# Patient Record
Sex: MALE | HISPANIC OR LATINO | Employment: OTHER | URBAN - METROPOLITAN AREA
[De-identification: names, ages, dates, MRNs, and addresses within clinical notes are randomized per-mention and may not be internally consistent; named-entity substitution may affect disease eponyms.]

---

## 2018-09-07 ENCOUNTER — HOSPITAL ENCOUNTER (OUTPATIENT)
Dept: PULMONOLOGY | Facility: MEDICAL CENTER | Age: 73
End: 2018-09-07
Attending: INTERNAL MEDICINE
Payer: COMMERCIAL

## 2018-09-07 PROCEDURE — 99201 HCHG OFFICE VISIT-NEW-LEVEL 1: CPT | Mod: 25

## 2018-09-07 PROCEDURE — 94618 PULMONARY STRESS TESTING: CPT

## 2018-09-11 ENCOUNTER — HOSPITAL ENCOUNTER (OUTPATIENT)
Dept: PULMONOLOGY | Facility: MEDICAL CENTER | Age: 73
End: 2018-09-11
Attending: INTERNAL MEDICINE
Payer: COMMERCIAL

## 2018-09-11 PROCEDURE — G0237 THERAPEUTIC PROCD STRG ENDUR: HCPCS

## 2018-09-11 PROCEDURE — G0239 OTH RESP PROC, GROUP: HCPCS

## 2018-09-13 ENCOUNTER — HOSPITAL ENCOUNTER (OUTPATIENT)
Dept: PULMONOLOGY | Facility: MEDICAL CENTER | Age: 73
End: 2018-09-13
Attending: INTERNAL MEDICINE
Payer: COMMERCIAL

## 2018-09-13 PROCEDURE — G0237 THERAPEUTIC PROCD STRG ENDUR: HCPCS

## 2018-09-13 PROCEDURE — G0239 OTH RESP PROC, GROUP: HCPCS

## 2018-09-18 ENCOUNTER — HOSPITAL ENCOUNTER (OUTPATIENT)
Dept: PULMONOLOGY | Facility: MEDICAL CENTER | Age: 73
End: 2018-09-18
Attending: INTERNAL MEDICINE
Payer: COMMERCIAL

## 2018-09-18 PROCEDURE — G0237 THERAPEUTIC PROCD STRG ENDUR: HCPCS

## 2018-09-18 PROCEDURE — G0239 OTH RESP PROC, GROUP: HCPCS

## 2018-09-20 ENCOUNTER — APPOINTMENT (OUTPATIENT)
Dept: PULMONOLOGY | Facility: MEDICAL CENTER | Age: 73
End: 2018-09-20
Attending: INTERNAL MEDICINE
Payer: COMMERCIAL

## 2018-09-25 ENCOUNTER — HOSPITAL ENCOUNTER (OUTPATIENT)
Dept: PULMONOLOGY | Facility: MEDICAL CENTER | Age: 73
End: 2018-09-25
Attending: INTERNAL MEDICINE
Payer: COMMERCIAL

## 2018-09-25 PROCEDURE — G0237 THERAPEUTIC PROCD STRG ENDUR: HCPCS

## 2018-09-25 PROCEDURE — G0239 OTH RESP PROC, GROUP: HCPCS

## 2018-09-27 ENCOUNTER — HOSPITAL ENCOUNTER (OUTPATIENT)
Dept: PULMONOLOGY | Facility: MEDICAL CENTER | Age: 73
End: 2018-09-27
Attending: INTERNAL MEDICINE
Payer: COMMERCIAL

## 2018-09-27 PROCEDURE — G0237 THERAPEUTIC PROCD STRG ENDUR: HCPCS

## 2018-09-27 PROCEDURE — G0239 OTH RESP PROC, GROUP: HCPCS

## 2018-10-02 ENCOUNTER — APPOINTMENT (OUTPATIENT)
Dept: PULMONOLOGY | Facility: MEDICAL CENTER | Age: 73
End: 2018-10-02
Attending: INTERNAL MEDICINE
Payer: COMMERCIAL

## 2018-10-04 ENCOUNTER — APPOINTMENT (OUTPATIENT)
Dept: PULMONOLOGY | Facility: MEDICAL CENTER | Age: 73
End: 2018-10-04
Attending: INTERNAL MEDICINE
Payer: COMMERCIAL

## 2018-10-09 ENCOUNTER — APPOINTMENT (OUTPATIENT)
Dept: PULMONOLOGY | Facility: MEDICAL CENTER | Age: 73
End: 2018-10-09
Attending: INTERNAL MEDICINE
Payer: COMMERCIAL

## 2018-10-11 ENCOUNTER — APPOINTMENT (OUTPATIENT)
Dept: PULMONOLOGY | Facility: MEDICAL CENTER | Age: 73
End: 2018-10-11
Attending: INTERNAL MEDICINE
Payer: COMMERCIAL

## 2018-10-16 ENCOUNTER — HOSPITAL ENCOUNTER (OUTPATIENT)
Dept: PULMONOLOGY | Facility: MEDICAL CENTER | Age: 73
End: 2018-10-16
Attending: INTERNAL MEDICINE
Payer: COMMERCIAL

## 2018-10-16 PROCEDURE — G0237 THERAPEUTIC PROCD STRG ENDUR: HCPCS | Mod: XU

## 2018-10-16 PROCEDURE — G0239 OTH RESP PROC, GROUP: HCPCS

## 2018-10-18 ENCOUNTER — HOSPITAL ENCOUNTER (OUTPATIENT)
Dept: PULMONOLOGY | Facility: MEDICAL CENTER | Age: 73
End: 2018-10-18
Attending: INTERNAL MEDICINE
Payer: COMMERCIAL

## 2018-10-18 PROCEDURE — G0239 OTH RESP PROC, GROUP: HCPCS

## 2018-10-18 PROCEDURE — G0237 THERAPEUTIC PROCD STRG ENDUR: HCPCS | Mod: XU

## 2018-10-23 ENCOUNTER — APPOINTMENT (OUTPATIENT)
Dept: PULMONOLOGY | Facility: MEDICAL CENTER | Age: 73
End: 2018-10-23
Attending: INTERNAL MEDICINE
Payer: COMMERCIAL

## 2018-10-30 ENCOUNTER — APPOINTMENT (OUTPATIENT)
Dept: PULMONOLOGY | Facility: MEDICAL CENTER | Age: 73
End: 2018-10-30
Attending: INTERNAL MEDICINE
Payer: COMMERCIAL

## 2018-11-06 ENCOUNTER — HOSPITAL ENCOUNTER (OUTPATIENT)
Dept: PULMONOLOGY | Facility: MEDICAL CENTER | Age: 73
End: 2018-11-06
Attending: INTERNAL MEDICINE
Payer: COMMERCIAL

## 2018-11-06 PROCEDURE — G0237 THERAPEUTIC PROCD STRG ENDUR: HCPCS | Mod: XU

## 2018-11-06 PROCEDURE — G0239 OTH RESP PROC, GROUP: HCPCS

## 2018-11-08 ENCOUNTER — HOSPITAL ENCOUNTER (OUTPATIENT)
Dept: PULMONOLOGY | Facility: MEDICAL CENTER | Age: 73
End: 2018-11-08
Attending: INTERNAL MEDICINE
Payer: COMMERCIAL

## 2018-11-08 PROCEDURE — G0237 THERAPEUTIC PROCD STRG ENDUR: HCPCS | Mod: XU

## 2018-11-08 PROCEDURE — G0239 OTH RESP PROC, GROUP: HCPCS

## 2018-11-13 ENCOUNTER — HOSPITAL ENCOUNTER (OUTPATIENT)
Dept: PULMONOLOGY | Facility: MEDICAL CENTER | Age: 73
End: 2018-11-13
Attending: INTERNAL MEDICINE
Payer: COMMERCIAL

## 2018-11-13 PROCEDURE — G0237 THERAPEUTIC PROCD STRG ENDUR: HCPCS | Mod: XU

## 2018-11-13 PROCEDURE — G0239 OTH RESP PROC, GROUP: HCPCS

## 2018-11-15 ENCOUNTER — APPOINTMENT (OUTPATIENT)
Dept: PULMONOLOGY | Facility: MEDICAL CENTER | Age: 73
End: 2018-11-15
Attending: INTERNAL MEDICINE
Payer: COMMERCIAL

## 2018-11-20 ENCOUNTER — HOSPITAL ENCOUNTER (OUTPATIENT)
Dept: PULMONOLOGY | Facility: MEDICAL CENTER | Age: 73
End: 2018-11-20
Attending: INTERNAL MEDICINE
Payer: COMMERCIAL

## 2018-11-20 PROCEDURE — G0237 THERAPEUTIC PROCD STRG ENDUR: HCPCS | Mod: XU

## 2018-11-20 PROCEDURE — G0239 OTH RESP PROC, GROUP: HCPCS

## 2018-11-27 ENCOUNTER — HOSPITAL ENCOUNTER (OUTPATIENT)
Dept: PULMONOLOGY | Facility: MEDICAL CENTER | Age: 73
End: 2018-11-27
Attending: INTERNAL MEDICINE
Payer: COMMERCIAL

## 2018-11-27 PROCEDURE — G0237 THERAPEUTIC PROCD STRG ENDUR: HCPCS | Mod: XU

## 2018-11-27 PROCEDURE — G0239 OTH RESP PROC, GROUP: HCPCS

## 2018-11-29 ENCOUNTER — HOSPITAL ENCOUNTER (OUTPATIENT)
Dept: PULMONOLOGY | Facility: MEDICAL CENTER | Age: 73
End: 2018-11-29
Attending: INTERNAL MEDICINE
Payer: COMMERCIAL

## 2018-11-29 PROCEDURE — G0239 OTH RESP PROC, GROUP: HCPCS

## 2018-11-29 PROCEDURE — G0237 THERAPEUTIC PROCD STRG ENDUR: HCPCS | Mod: XU

## 2018-12-04 ENCOUNTER — HOSPITAL ENCOUNTER (OUTPATIENT)
Dept: PULMONOLOGY | Facility: MEDICAL CENTER | Age: 73
End: 2018-12-04
Attending: INTERNAL MEDICINE
Payer: MEDICARE

## 2018-12-04 PROCEDURE — G0239 OTH RESP PROC, GROUP: HCPCS

## 2018-12-04 PROCEDURE — G0237 THERAPEUTIC PROCD STRG ENDUR: HCPCS | Mod: XU

## 2018-12-06 ENCOUNTER — HOSPITAL ENCOUNTER (OUTPATIENT)
Dept: PULMONOLOGY | Facility: MEDICAL CENTER | Age: 73
End: 2018-12-06
Attending: INTERNAL MEDICINE
Payer: MEDICARE

## 2018-12-06 PROCEDURE — G0239 OTH RESP PROC, GROUP: HCPCS

## 2018-12-06 PROCEDURE — G0237 THERAPEUTIC PROCD STRG ENDUR: HCPCS | Mod: XU

## 2018-12-11 ENCOUNTER — HOSPITAL ENCOUNTER (OUTPATIENT)
Dept: PULMONOLOGY | Facility: MEDICAL CENTER | Age: 73
End: 2018-12-11
Attending: INTERNAL MEDICINE
Payer: MEDICARE

## 2018-12-11 PROCEDURE — G0237 THERAPEUTIC PROCD STRG ENDUR: HCPCS | Mod: XU

## 2018-12-11 PROCEDURE — G0239 OTH RESP PROC, GROUP: HCPCS

## 2018-12-13 ENCOUNTER — HOSPITAL ENCOUNTER (OUTPATIENT)
Dept: PULMONOLOGY | Facility: MEDICAL CENTER | Age: 73
End: 2018-12-13
Attending: INTERNAL MEDICINE
Payer: MEDICARE

## 2018-12-13 PROCEDURE — G0237 THERAPEUTIC PROCD STRG ENDUR: HCPCS | Mod: XU

## 2018-12-13 PROCEDURE — G0239 OTH RESP PROC, GROUP: HCPCS

## 2018-12-18 ENCOUNTER — HOSPITAL ENCOUNTER (OUTPATIENT)
Dept: PULMONOLOGY | Facility: MEDICAL CENTER | Age: 73
End: 2018-12-18
Attending: INTERNAL MEDICINE
Payer: MEDICARE

## 2018-12-20 ENCOUNTER — HOSPITAL ENCOUNTER (OUTPATIENT)
Dept: PULMONOLOGY | Facility: MEDICAL CENTER | Age: 73
End: 2018-12-20
Attending: INTERNAL MEDICINE
Payer: MEDICARE

## 2018-12-20 PROCEDURE — G0239 OTH RESP PROC, GROUP: HCPCS

## 2018-12-20 PROCEDURE — G0237 THERAPEUTIC PROCD STRG ENDUR: HCPCS

## 2021-01-15 DIAGNOSIS — Z23 NEED FOR VACCINATION: ICD-10-CM

## 2023-03-28 ENCOUNTER — HOSPITAL ENCOUNTER (INPATIENT)
Facility: MEDICAL CENTER | Age: 78
LOS: 3 days | DRG: 871 | End: 2023-03-31
Attending: EMERGENCY MEDICINE | Admitting: INTERNAL MEDICINE
Payer: COMMERCIAL

## 2023-03-28 ENCOUNTER — APPOINTMENT (OUTPATIENT)
Dept: RADIOLOGY | Facility: MEDICAL CENTER | Age: 78
DRG: 871 | End: 2023-03-28
Attending: EMERGENCY MEDICINE
Payer: COMMERCIAL

## 2023-03-28 DIAGNOSIS — Z51.5 PATIENT ON FULL HOSPICE CARE: ICD-10-CM

## 2023-03-28 DIAGNOSIS — J84.9 INTERSTITIAL LUNG DISEASE (HCC): ICD-10-CM

## 2023-03-28 DIAGNOSIS — C61 PROSTATE CANCER (HCC): ICD-10-CM

## 2023-03-28 DIAGNOSIS — J18.9 COMMUNITY ACQUIRED PNEUMONIA, UNSPECIFIED LATERALITY: ICD-10-CM

## 2023-03-28 PROBLEM — Z79.4 TYPE 2 DIABETES MELLITUS WITH HYPERGLYCEMIA, WITH LONG-TERM CURRENT USE OF INSULIN (HCC): Status: ACTIVE | Noted: 2023-03-28

## 2023-03-28 PROBLEM — A41.9 SEPSIS (HCC): Status: ACTIVE | Noted: 2023-03-28

## 2023-03-28 PROBLEM — E11.65 TYPE 2 DIABETES MELLITUS WITH HYPERGLYCEMIA, WITH LONG-TERM CURRENT USE OF INSULIN (HCC): Status: ACTIVE | Noted: 2023-03-28

## 2023-03-28 PROBLEM — E87.5 HYPERKALEMIA: Status: ACTIVE | Noted: 2023-03-28

## 2023-03-28 PROBLEM — N40.0 BPH (BENIGN PROSTATIC HYPERPLASIA): Status: ACTIVE | Noted: 2023-03-28

## 2023-03-28 PROBLEM — J96.21 ACUTE ON CHRONIC RESPIRATORY FAILURE WITH HYPOXIA (HCC): Status: ACTIVE | Noted: 2023-03-28

## 2023-03-28 LAB
ALBUMIN SERPL BCP-MCNC: 4.1 G/DL (ref 3.2–4.9)
ALBUMIN/GLOB SERPL: 1.3 G/DL
ALP SERPL-CCNC: 59 U/L (ref 30–99)
ALT SERPL-CCNC: 38 U/L (ref 2–50)
ANION GAP SERPL CALC-SCNC: 11 MMOL/L (ref 7–16)
ANION GAP SERPL CALC-SCNC: 13 MMOL/L (ref 7–16)
AST SERPL-CCNC: 38 U/L (ref 12–45)
BASOPHILS # BLD AUTO: 0.4 % (ref 0–1.8)
BASOPHILS # BLD: 0.06 K/UL (ref 0–0.12)
BILIRUB SERPL-MCNC: 0.4 MG/DL (ref 0.1–1.5)
BUN SERPL-MCNC: 20 MG/DL (ref 8–22)
BUN SERPL-MCNC: 21 MG/DL (ref 8–22)
CALCIUM ALBUM COR SERPL-MCNC: 8.9 MG/DL (ref 8.5–10.5)
CALCIUM SERPL-MCNC: 7.2 MG/DL (ref 8.5–10.5)
CALCIUM SERPL-MCNC: 9 MG/DL (ref 8.5–10.5)
CHLORIDE SERPL-SCNC: 86 MMOL/L (ref 96–112)
CHLORIDE SERPL-SCNC: 97 MMOL/L (ref 96–112)
CO2 SERPL-SCNC: 21 MMOL/L (ref 20–33)
CO2 SERPL-SCNC: 29 MMOL/L (ref 20–33)
CREAT SERPL-MCNC: 0.72 MG/DL (ref 0.5–1.4)
CREAT SERPL-MCNC: 0.9 MG/DL (ref 0.5–1.4)
EKG IMPRESSION: NORMAL
EKG IMPRESSION: NORMAL
EOSINOPHIL # BLD AUTO: 0 K/UL (ref 0–0.51)
EOSINOPHIL NFR BLD: 0 % (ref 0–6.9)
ERYTHROCYTE [DISTWIDTH] IN BLOOD BY AUTOMATED COUNT: 45.5 FL (ref 35.9–50)
FLUAV RNA SPEC QL NAA+PROBE: NEGATIVE
FLUBV RNA SPEC QL NAA+PROBE: NEGATIVE
GFR SERPLBLD CREATININE-BSD FMLA CKD-EPI: 89 ML/MIN/1.73 M 2
GFR SERPLBLD CREATININE-BSD FMLA CKD-EPI: 95 ML/MIN/1.73 M 2
GLOBULIN SER CALC-MCNC: 3.2 G/DL (ref 1.9–3.5)
GLUCOSE BLD STRIP.AUTO-MCNC: 125 MG/DL (ref 65–99)
GLUCOSE BLD STRIP.AUTO-MCNC: 129 MG/DL (ref 65–99)
GLUCOSE BLD STRIP.AUTO-MCNC: 88 MG/DL (ref 65–99)
GLUCOSE SERPL-MCNC: 164 MG/DL (ref 65–99)
GLUCOSE SERPL-MCNC: 713 MG/DL (ref 65–99)
HCT VFR BLD AUTO: 39.7 % (ref 42–52)
HGB BLD-MCNC: 12.7 G/DL (ref 14–18)
IMM GRANULOCYTES # BLD AUTO: 0.08 K/UL (ref 0–0.11)
IMM GRANULOCYTES NFR BLD AUTO: 0.6 % (ref 0–0.9)
LACTATE SERPL-SCNC: 2.2 MMOL/L (ref 0.5–2)
LACTATE SERPL-SCNC: 3.1 MMOL/L (ref 0.5–2)
LACTATE SERPL-SCNC: 3.6 MMOL/L (ref 0.5–2)
LYMPHOCYTES # BLD AUTO: 1.34 K/UL (ref 1–4.8)
LYMPHOCYTES NFR BLD: 9.5 % (ref 22–41)
MCH RBC QN AUTO: 27.9 PG (ref 27–33)
MCHC RBC AUTO-ENTMCNC: 32 G/DL (ref 33.7–35.3)
MCV RBC AUTO: 87.1 FL (ref 81.4–97.8)
MONOCYTES # BLD AUTO: 1.18 K/UL (ref 0–0.85)
MONOCYTES NFR BLD AUTO: 8.3 % (ref 0–13.4)
NEUTROPHILS # BLD AUTO: 11.51 K/UL (ref 1.82–7.42)
NEUTROPHILS NFR BLD: 81.2 % (ref 44–72)
NRBC # BLD AUTO: 0 K/UL
NRBC BLD-RTO: 0 /100 WBC
NT-PROBNP SERPL IA-MCNC: 2076 PG/ML (ref 0–125)
PLATELET # BLD AUTO: 301 K/UL (ref 164–446)
PMV BLD AUTO: 10.9 FL (ref 9–12.9)
POTASSIUM SERPL-SCNC: 4.6 MMOL/L (ref 3.6–5.5)
POTASSIUM SERPL-SCNC: 6.3 MMOL/L (ref 3.6–5.5)
PROCALCITONIN SERPL-MCNC: 0.62 NG/ML
PROT SERPL-MCNC: 7.3 G/DL (ref 6–8.2)
RBC # BLD AUTO: 4.56 M/UL (ref 4.7–6.1)
RSV RNA SPEC QL NAA+PROBE: NEGATIVE
SARS-COV-2 RNA RESP QL NAA+PROBE: NOTDETECTED
SODIUM SERPL-SCNC: 120 MMOL/L (ref 135–145)
SODIUM SERPL-SCNC: 137 MMOL/L (ref 135–145)
SPECIMEN SOURCE: NORMAL
WBC # BLD AUTO: 14.2 K/UL (ref 4.8–10.8)

## 2023-03-28 PROCEDURE — 83605 ASSAY OF LACTIC ACID: CPT

## 2023-03-28 PROCEDURE — 700111 HCHG RX REV CODE 636 W/ 250 OVERRIDE (IP): Performed by: EMERGENCY MEDICINE

## 2023-03-28 PROCEDURE — 93010 ELECTROCARDIOGRAM REPORT: CPT | Mod: GW | Performed by: INTERNAL MEDICINE

## 2023-03-28 PROCEDURE — 80053 COMPREHEN METABOLIC PANEL: CPT

## 2023-03-28 PROCEDURE — 93005 ELECTROCARDIOGRAM TRACING: CPT | Performed by: EMERGENCY MEDICINE

## 2023-03-28 PROCEDURE — C9803 HOPD COVID-19 SPEC COLLECT: HCPCS | Performed by: EMERGENCY MEDICINE

## 2023-03-28 PROCEDURE — 36415 COLL VENOUS BLD VENIPUNCTURE: CPT

## 2023-03-28 PROCEDURE — 87040 BLOOD CULTURE FOR BACTERIA: CPT | Mod: 91

## 2023-03-28 PROCEDURE — 700101 HCHG RX REV CODE 250: Performed by: EMERGENCY MEDICINE

## 2023-03-28 PROCEDURE — 99223 1ST HOSP IP/OBS HIGH 75: CPT | Mod: AI | Performed by: INTERNAL MEDICINE

## 2023-03-28 PROCEDURE — 96365 THER/PROPH/DIAG IV INF INIT: CPT

## 2023-03-28 PROCEDURE — 700105 HCHG RX REV CODE 258: Performed by: EMERGENCY MEDICINE

## 2023-03-28 PROCEDURE — 93005 ELECTROCARDIOGRAM TRACING: CPT

## 2023-03-28 PROCEDURE — 0241U HCHG SARS-COV-2 COVID-19 NFCT DS RESP RNA 4 TRGT MIC: CPT

## 2023-03-28 PROCEDURE — 71045 X-RAY EXAM CHEST 1 VIEW: CPT

## 2023-03-28 PROCEDURE — 82962 GLUCOSE BLOOD TEST: CPT | Mod: 91

## 2023-03-28 PROCEDURE — 83880 ASSAY OF NATRIURETIC PEPTIDE: CPT

## 2023-03-28 PROCEDURE — A9270 NON-COVERED ITEM OR SERVICE: HCPCS | Performed by: INTERNAL MEDICINE

## 2023-03-28 PROCEDURE — 700102 HCHG RX REV CODE 250 W/ 637 OVERRIDE(OP): Performed by: INTERNAL MEDICINE

## 2023-03-28 PROCEDURE — 84145 PROCALCITONIN (PCT): CPT

## 2023-03-28 PROCEDURE — 770020 HCHG ROOM/CARE - TELE (206)

## 2023-03-28 PROCEDURE — 85025 COMPLETE CBC W/AUTO DIFF WBC: CPT

## 2023-03-28 PROCEDURE — 80048 BASIC METABOLIC PNL TOTAL CA: CPT

## 2023-03-28 PROCEDURE — 700105 HCHG RX REV CODE 258: Performed by: INTERNAL MEDICINE

## 2023-03-28 PROCEDURE — 700111 HCHG RX REV CODE 636 W/ 250 OVERRIDE (IP): Performed by: INTERNAL MEDICINE

## 2023-03-28 PROCEDURE — 99285 EMERGENCY DEPT VISIT HI MDM: CPT

## 2023-03-28 PROCEDURE — 96375 TX/PRO/DX INJ NEW DRUG ADDON: CPT

## 2023-03-28 RX ORDER — AZITHROMYCIN 500 MG/5ML
500 INJECTION, POWDER, LYOPHILIZED, FOR SOLUTION INTRAVENOUS ONCE
Status: COMPLETED | OUTPATIENT
Start: 2023-03-28 | End: 2023-03-28

## 2023-03-28 RX ORDER — AMOXICILLIN 250 MG
2 CAPSULE ORAL 2 TIMES DAILY
Status: DISCONTINUED | OUTPATIENT
Start: 2023-03-28 | End: 2023-03-31 | Stop reason: HOSPADM

## 2023-03-28 RX ORDER — TAMSULOSIN HYDROCHLORIDE 0.4 MG/1
0.4 CAPSULE ORAL DAILY
COMMUNITY

## 2023-03-28 RX ORDER — CEFTRIAXONE 2 G/1
2000 INJECTION, POWDER, FOR SOLUTION INTRAMUSCULAR; INTRAVENOUS ONCE
Status: COMPLETED | OUTPATIENT
Start: 2023-03-28 | End: 2023-03-28

## 2023-03-28 RX ORDER — SODIUM CHLORIDE 9 MG/ML
1000 INJECTION, SOLUTION INTRAVENOUS ONCE
Status: COMPLETED | OUTPATIENT
Start: 2023-03-28 | End: 2023-03-28

## 2023-03-28 RX ORDER — ACETAMINOPHEN 325 MG/1
650 TABLET ORAL EVERY 6 HOURS PRN
Status: DISCONTINUED | OUTPATIENT
Start: 2023-03-28 | End: 2023-03-31 | Stop reason: HOSPADM

## 2023-03-28 RX ORDER — ONDANSETRON 2 MG/ML
4 INJECTION INTRAMUSCULAR; INTRAVENOUS EVERY 4 HOURS PRN
Status: DISCONTINUED | OUTPATIENT
Start: 2023-03-28 | End: 2023-03-31 | Stop reason: HOSPADM

## 2023-03-28 RX ORDER — ENOXAPARIN SODIUM 100 MG/ML
40 INJECTION SUBCUTANEOUS DAILY
Status: DISCONTINUED | OUTPATIENT
Start: 2023-03-28 | End: 2023-03-31 | Stop reason: HOSPADM

## 2023-03-28 RX ORDER — ONDANSETRON 4 MG/1
4 TABLET, ORALLY DISINTEGRATING ORAL EVERY 4 HOURS PRN
Status: DISCONTINUED | OUTPATIENT
Start: 2023-03-28 | End: 2023-03-31 | Stop reason: HOSPADM

## 2023-03-28 RX ORDER — DOCUSATE CALCIUM 240 MG
240 CAPSULE ORAL
COMMUNITY

## 2023-03-28 RX ORDER — POLYETHYLENE GLYCOL 3350 17 G/17G
1 POWDER, FOR SOLUTION ORAL
Status: DISCONTINUED | OUTPATIENT
Start: 2023-03-28 | End: 2023-03-31 | Stop reason: HOSPADM

## 2023-03-28 RX ORDER — INSULIN GLARGINE 100 [IU]/ML
20 INJECTION, SOLUTION SUBCUTANEOUS EVERY EVENING
COMMUNITY

## 2023-03-28 RX ORDER — BISACODYL 10 MG
10 SUPPOSITORY, RECTAL RECTAL
Status: DISCONTINUED | OUTPATIENT
Start: 2023-03-28 | End: 2023-03-31 | Stop reason: HOSPADM

## 2023-03-28 RX ORDER — SODIUM CHLORIDE 9 MG/ML
INJECTION, SOLUTION INTRAVENOUS CONTINUOUS
Status: DISCONTINUED | OUTPATIENT
Start: 2023-03-28 | End: 2023-03-31 | Stop reason: HOSPADM

## 2023-03-28 RX ORDER — TAMSULOSIN HYDROCHLORIDE 0.4 MG/1
0.4 CAPSULE ORAL DAILY
Status: DISCONTINUED | OUTPATIENT
Start: 2023-03-28 | End: 2023-03-31 | Stop reason: HOSPADM

## 2023-03-28 RX ORDER — AZITHROMYCIN 250 MG/1
500 TABLET, FILM COATED ORAL DAILY
Status: COMPLETED | OUTPATIENT
Start: 2023-03-29 | End: 2023-03-30

## 2023-03-28 RX ADMIN — SODIUM CHLORIDE: 9 INJECTION, SOLUTION INTRAVENOUS at 17:19

## 2023-03-28 RX ADMIN — AZITHROMYCIN MONOHYDRATE 500 MG: 500 INJECTION, POWDER, LYOPHILIZED, FOR SOLUTION INTRAVENOUS at 14:45

## 2023-03-28 RX ADMIN — SODIUM CHLORIDE 1000 ML: 9 INJECTION, SOLUTION INTRAVENOUS at 14:45

## 2023-03-28 RX ADMIN — SENNOSIDES AND DOCUSATE SODIUM 2 TABLET: 50; 8.6 TABLET ORAL at 17:18

## 2023-03-28 RX ADMIN — TAMSULOSIN HYDROCHLORIDE 0.4 MG: 0.4 CAPSULE ORAL at 17:18

## 2023-03-28 RX ADMIN — INSULIN GLARGINE-YFGN 20 UNITS: 100 INJECTION, SOLUTION SUBCUTANEOUS at 18:43

## 2023-03-28 RX ADMIN — CEFTRIAXONE SODIUM 2000 MG: 2 INJECTION, POWDER, FOR SOLUTION INTRAMUSCULAR; INTRAVENOUS at 14:45

## 2023-03-28 RX ADMIN — INSULIN HUMAN 14 UNITS: 100 INJECTION, SOLUTION PARENTERAL at 18:42

## 2023-03-28 RX ADMIN — ENOXAPARIN SODIUM 40 MG: 100 INJECTION SUBCUTANEOUS at 17:18

## 2023-03-28 RX ADMIN — INSULIN HUMAN 5 UNITS: 100 INJECTION, SOLUTION PARENTERAL at 18:42

## 2023-03-28 ASSESSMENT — LIFESTYLE VARIABLES
AVERAGE NUMBER OF DAYS PER WEEK YOU HAVE A DRINK CONTAINING ALCOHOL: 0
CONSUMPTION TOTAL: NEGATIVE
ON A TYPICAL DAY WHEN YOU DRINK ALCOHOL HOW MANY DRINKS DO YOU HAVE: 0
HAVE YOU EVER FELT YOU SHOULD CUT DOWN ON YOUR DRINKING: NO
HOW MANY TIMES IN THE PAST YEAR HAVE YOU HAD 5 OR MORE DRINKS IN A DAY: 0
TOTAL SCORE: 0
HAVE PEOPLE ANNOYED YOU BY CRITICIZING YOUR DRINKING: NO
EVER FELT BAD OR GUILTY ABOUT YOUR DRINKING: NO
ALCOHOL_USE: NO
TOTAL SCORE: 0
EVER HAD A DRINK FIRST THING IN THE MORNING TO STEADY YOUR NERVES TO GET RID OF A HANGOVER: NO
TOTAL SCORE: 0

## 2023-03-28 ASSESSMENT — ENCOUNTER SYMPTOMS
SPUTUM PRODUCTION: 1
COUGH: 1
DIAPHORESIS: 0
SEIZURES: 0
WHEEZING: 0
BACK PAIN: 0
CHILLS: 0
VOMITING: 0
FLANK PAIN: 0
BRUISES/BLEEDS EASILY: 0
SORE THROAT: 0
BLURRED VISION: 0
NECK PAIN: 0
MYALGIAS: 0
SHORTNESS OF BREATH: 1
HEADACHES: 0
DIARRHEA: 0
FEVER: 0
DIZZINESS: 0
ABDOMINAL PAIN: 0
NAUSEA: 0
BLOOD IN STOOL: 0
PALPITATIONS: 0
FOCAL WEAKNESS: 0

## 2023-03-28 ASSESSMENT — COGNITIVE AND FUNCTIONAL STATUS - GENERAL
SUGGESTED CMS G CODE MODIFIER DAILY ACTIVITY: CH
SUGGESTED CMS G CODE MODIFIER MOBILITY: CH
DAILY ACTIVITIY SCORE: 24
MOBILITY SCORE: 24

## 2023-03-28 ASSESSMENT — PATIENT HEALTH QUESTIONNAIRE - PHQ9
SUM OF ALL RESPONSES TO PHQ9 QUESTIONS 1 AND 2: 0
2. FEELING DOWN, DEPRESSED, IRRITABLE, OR HOPELESS: NOT AT ALL
1. LITTLE INTEREST OR PLEASURE IN DOING THINGS: NOT AT ALL

## 2023-03-28 NOTE — ASSESSMENT & PLAN NOTE
Blood glucose 713 at admission, likely lab error given other values and no further evaluated values despite minimal therapy. A1c 6.0 (3/29/23).  - SSI  - add glargine as clinically indicated  - hypoglycemic protocol

## 2023-03-28 NOTE — ASSESSMENT & PLAN NOTE
Likely secondary to pneumonia. On 2L of o2 at baseline, required 3L in the ED. Echo (3/29/23) EF 55%.  - RT protocol  - treat possible infection  - IVF for now  - wean off o2 as tolerated

## 2023-03-28 NOTE — ED PROVIDER NOTES
ED Provider Note    CHIEF COMPLAINT  Chief Complaint   Patient presents with    ALOC     Pt's caregiver called for a wellness check after not being able to get ahold of patient by telephone. LKW 0030 this morning. Pt was 75% on RA upon EMS arrival. Pt is supposed to wear oxygen at all times at home. Pt was not wearing his oxygen and was lethargic. Pt is on hospice for prostate cancer, pt states he is not receiving chemotherapy. Pt presents with POLST. Pt's only complaint is right arm pain. Pt denies falls.        EXTERNAL RECORDS REVIEWED  Reviewed outpatient clinic visits laboratory studies POLST paperwork  HPI/ROS  LIMITATION TO HISTORY   Patient is a poor historian  OUTSIDE HISTORIAN(S):  Family provided additional history    Blair Bah is a 78 y.o. male who presents for evaluation of confusion cough not feeling well with hypoxia.  The patient apparently has a history of prostate cancer.  The initial nursing note was confusing but I clarified with the patient and his family that he is limited goals of care but not currently on hospice.  He is post to be on oxygen at night and currently was off it last night because he would did not feel well.  He is noted to be hypoxic and he does admit to low-grade fever and cough.  He reports feeling short of breath.    PAST MEDICAL HISTORY     Prostate cancer  SURGICAL HISTORY  patient denies any surgical history  Unknown  FAMILY HISTORY  History reviewed. No pertinent family history.  Noncontributory  SOCIAL HISTORY  Social History     Tobacco Use    Smoking status: Former     Types: Cigarettes    Smokeless tobacco: Never   Vaping Use    Vaping Use: Never used   Substance and Sexual Activity    Alcohol use: Not Currently    Drug use: Not Currently    Sexual activity: Not on file       CURRENT MEDICATIONS  Home Medications       Reviewed by Rebekah Bell R.N. (Registered Nurse) on 03/28/23 at 1225  Med List Status: Not Addressed     Medication Last Dose Status     "    Patient Manny Taking any Medications                           ALLERGIES  No Known Allergies    PHYSICAL EXAM  VITAL SIGNS: /55   Pulse (!) 104   Temp 36.3 °C (97.3 °F) (Temporal)   Resp 18   Ht 1.803 m (5' 11\")   Wt 79.4 kg (175 lb)   SpO2 97%   BMI 24.41 kg/m²    Pulse ox interpretation: I interpret this pulse ox as normal.  Constitutional: Alert and oriented x 3, mild distress, patient appears chronically ill  HEENT: Atraumatic normocephalic, pupils are equal round reactive to light extraocular movements are intact. The nares is clear, external ears are normal, mouth shows moist mucous membranes normal dentition for age  Neck: Supple, no JVD no tracheal deviation  Cardiovascular: Mild tachycardia no murmur rub or gallop 2+ pulses peripherally x4  Thorax & Lungs: Bilateral rhonchi no wheezing  GI: Soft nontender nondistended positive bowel sounds, no peritoneal signs  Skin: Warm dry no acute rash or lesion  Musculoskeletal: Moving all extremities with full range and 5 of 5 strength no acute  deformity  Neurologic: Cranial nerves III through XII are grossly intact no sensory deficit no cerebellar dysfunction   Psychiatric: Appropriate affect for situation at this time          DIAGNOSTIC STUDIES / PROCEDURES    LABS  Results for orders placed or performed during the hospital encounter of 03/28/23   CBC with Differential   Result Value Ref Range    WBC 14.2 (H) 4.8 - 10.8 K/uL    RBC 4.56 (L) 4.70 - 6.10 M/uL    Hemoglobin 12.7 (L) 14.0 - 18.0 g/dL    Hematocrit 39.7 (L) 42.0 - 52.0 %    MCV 87.1 81.4 - 97.8 fL    MCH 27.9 27.0 - 33.0 pg    MCHC 32.0 (L) 33.7 - 35.3 g/dL    RDW 45.5 35.9 - 50.0 fL    Platelet Count 301 164 - 446 K/uL    MPV 10.9 9.0 - 12.9 fL    Neutrophils-Polys 81.20 (H) 44.00 - 72.00 %    Lymphocytes 9.50 (L) 22.00 - 41.00 %    Monocytes 8.30 0.00 - 13.40 %    Eosinophils 0.00 0.00 - 6.90 %    Basophils 0.40 0.00 - 1.80 %    Immature Granulocytes 0.60 0.00 - 0.90 %    Nucleated " RBC 0.00 /100 WBC    Neutrophils (Absolute) 11.51 (H) 1.82 - 7.42 K/uL    Lymphs (Absolute) 1.34 1.00 - 4.80 K/uL    Monos (Absolute) 1.18 (H) 0.00 - 0.85 K/uL    Eos (Absolute) 0.00 0.00 - 0.51 K/uL    Baso (Absolute) 0.06 0.00 - 0.12 K/uL    Immature Granulocytes (abs) 0.08 0.00 - 0.11 K/uL    NRBC (Absolute) 0.00 K/uL   Comp Metabolic Panel   Result Value Ref Range    Sodium 137 135 - 145 mmol/L    Potassium 6.3 (H) 3.6 - 5.5 mmol/L    Chloride 97 96 - 112 mmol/L    Co2 29 20 - 33 mmol/L    Anion Gap 11.0 7.0 - 16.0    Glucose 164 (H) 65 - 99 mg/dL    Bun 20 8 - 22 mg/dL    Creatinine 0.90 0.50 - 1.40 mg/dL    Calcium 9.0 8.5 - 10.5 mg/dL    AST(SGOT) 38 12 - 45 U/L    ALT(SGPT) 38 2 - 50 U/L    Alkaline Phosphatase 59 30 - 99 U/L    Total Bilirubin 0.4 0.1 - 1.5 mg/dL    Albumin 4.1 3.2 - 4.9 g/dL    Total Protein 7.3 6.0 - 8.2 g/dL    Globulin 3.2 1.9 - 3.5 g/dL    A-G Ratio 1.3 g/dL   CoV-2, FLU A/B, and RSV by PCR (2-4 Hours CEPHEID) : Collect NP swab in [facility]    Specimen: Nasal; Respirate   Result Value Ref Range    SARS-CoV-2 Source NP Swab    Lactic Acid   Result Value Ref Range    Lactic Acid 3.6 (H) 0.5 - 2.0 mmol/L   ESTIMATED GFR   Result Value Ref Range    GFR (CKD-EPI) 89 >60 mL/min/1.73 m 2   CORRECTED CALCIUM   Result Value Ref Range    Correct Calcium 8.9 8.5 - 10.5 mg/dL   PROCALCITONIN   Result Value Ref Range    Procalcitonin 0.62 (H) <0.25 ng/mL   proBrain Natriuretic Peptide, NT   Result Value Ref Range    NT-proBNP 2076 (H) 0 - 125 pg/mL   EKG   Result Value Ref Range    Report       [facility] Emergency Dept.    Test Date:  2023  Pt Name:    RENETTA CHAND                 Department: ER  MRN:        [id number]                      Room:       RD 12  Gender:     Male                         Technician: [id number]  :        1945                   Requested By:ER TRIAGE PROTOCOL  Order #:    [id number]                    Reading MD:    Measurements  Intervals                                 Axis  Rate:       114                          P:          37  IL:         146                          QRS:        35  QRSD:       89                           T:          6  QT:         311  QTc:        429    Interpretive Statements  Sinus tachycardia  No previous ECG available for comparison       12-lead EKG interpretation by me rate 114 sinus tachycardia no acute ST segment elevation or depression no pathological T wave inversions intervals and axis are normal no suggestion of heart block arrhythmia or ischemia    RADIOLOGY  I have independently interpreted the diagnostic imaging associated with this visit and am waiting the final reading from the radiologist.   My preliminary interpretation is as follows: Diffuse interstitial marking concerning for atypical pneumonia  Radiologist interpretation:   DX-CHEST-PORTABLE (1 VIEW)   Final Result      1.  Diffuse increased interstitial markings throughout the lung fields consistent with interstitial edema, pneumonitis, or parenchymal scarring.          COURSE & MEDICAL DECISION MAKING    ED Observation Status? No; Patient does not meet criteria for ED Observation.     INITIAL ASSESSMENT, COURSE AND PLAN  Care Narrative:     This is a very pleasant 78-year-old gentleman who presents here with general malaise cough and hypoxia.  He was slightly confused once we placed him on our high flow oxygen he had a normal sensorium and a nonfocal neurological exam.  He had leukocytosis with left shift and elevated procalcitonin.  Septic protocol was initiated once I clarified that he would like to be treated.  He apparently is somewhat on hospice but has a POLST indicating limited goals of care and no extraordinary measures such as chest compressions or intubation or ICU care but treat, relatively easily treatable conditions.  Here I suspect he has an infectious process although heart failure is a consideration with an elevated BNP.  He also has a slightly  elevated potassium with a normal EKG.  He was given IV Rocephin and azithromycin and placed on 3 to 4 L nasal cannula.  He will be admitted to the hospitalist service for further treatment and evaluation      ADDITIONAL PROBLEM LIST    DISPOSITION AND DISCUSSIONS  I have discussed management of the patient with the following physicians and ROBIN's: Discussed with admitting hospitalist    Discussion of management with other QHP or appropriate source(s): Discussed with pharmacist regarding initial antibiotic choice    Escalation of care considered, and ultimately not performed: Not applicable    Barriers to care at this time, including but not limited to: None    Decision tools and prescription drugs considered including, but not limited to: Not applicable    FINAL DIAGNOSIS  Community acquired pneumonia with hypoxia  Prostate cancer       Electronically signed by: Aiden Jarvis M.D., 3/28/2023 12:42 PM

## 2023-03-28 NOTE — ASSESSMENT & PLAN NOTE
"This is Sepsis Present on admission  SIRS criteria identified on my evaluation include: Tachycardia, with heart rate greater than 90 BPM, Tachypnea, with respirations greater than 20 per minute and Leukocytosis, with WBC greater than 12,000  Source is pneumonia  Sepsis protocol initiated  Fluid resuscitation ordered per protocol  Crystalloid Fluid Administration: Fluid resuscitation ordered per standard protocol - 30 mL/kg per current or ideal body weight  IV antibiotics as appropriate for source of sepsis  Reassessment: I have reassessed the patient's hemodynamic status    See \"Pneumonia' problem for additional information.  "

## 2023-03-28 NOTE — ED TRIAGE NOTES
"Chief Complaint   Patient presents with    ALOC     Pt's caregiver called for a wellness check after not being able to get ahold of patient by telephone. LKW 0030 this morning. Pt was 75% on RA upon EMS arrival. Pt is supposed to wear oxygen at all times at home. Pt was not wearing his oxygen and was lethargic. Pt is on hospice for prostate cancer, pt states he is not receiving chemotherapy. Pt presents with POLST. Pt's only complaint is right arm pain. Pt denies falls.      /70   Pulse (!) 117   Resp (!) 10   Ht 1.803 m (5' 11\")   Wt 79.4 kg (175 lb)   SpO2 98%   BMI 24.41 kg/m²     "

## 2023-03-28 NOTE — DISCHARGE PLANNING
SW notified by Rachna from [facility] that Pt is on their services. Per Rachna if Pt is medically cleared and discharges to home we can call \[facility]\"" and they will send a member of their team over to check in on Pt. And get him settled for the evening.     [facility]  [phone]

## 2023-03-28 NOTE — H&P
Hospital Medicine History & Physical Note    Date of Service  3/28/2023    Primary Care Physician  Morenita Min M.D.    Consultants      Code Status  DNAR/DNI    Chief Complaint  Chief Complaint   Patient presents with    ALOC     Pt's caregiver called for a wellness check after not being able to get ahold of patient by telephone. LKW 0030 this morning. Pt was 75% on RA upon EMS arrival. Pt is supposed to wear oxygen at all times at home. Pt was not wearing his oxygen and was lethargic. Pt is on hospice for prostate cancer, pt states he is not receiving chemotherapy. Pt presents with POLST. Pt's only complaint is right arm pain. Pt denies falls.        History of Presenting Illness  Blair Bah is a 78 y.o. male with a past medical history of insulin-dependent diabetes mellitus, BPH, prostate cancer, chronic respiratory failure on 2 L of oxygen who presented 3/28/2023 with shortness of breath for the past 4 days.  The patient reports worsening shortness of breath and a cough.  He denies any fevers, chills, chest pain, nausea, vomiting or abdominal pain.  Patient was found to be hypoxic on room air with O2 sats of 75% and was brought into the ER.  In the ER he is requiring 3 L of oxygen via nasal cannula.  He is noted to be tachycardic with a heart rate of 109 and hypotensive with a blood pressure 98/53.  He is noted to have leukocytosis of 14.2.  And hyperkalemia at 6.3.    Chest x-ray interpreted by me reveals diffuse increased interstitial markings consistent with pneumonitis versus interstitial edema.  EKG interpreted by me reveals sinus tachycardia with peaked T waves in V2.  No ST elevation or ST depression noted.  Patient states he is not undergoing any chemotherapy for his prostate cancer.  I had a discussion with the patient regarding goals of care and he would like to treat any reversible causes like pneumonia or hyperkalemia however he would like to be a DO NOT RESUSCITATE and is not interested  in pursuing treatment for his prostate cancer.    I discussed the plan of care with patient.    Review of Systems  Review of Systems   Constitutional:  Positive for malaise/fatigue. Negative for chills, diaphoresis and fever.   HENT:  Negative for hearing loss and sore throat.    Eyes:  Negative for blurred vision.   Respiratory:  Positive for cough, sputum production and shortness of breath. Negative for wheezing.    Cardiovascular:  Negative for chest pain, palpitations and leg swelling.   Gastrointestinal:  Negative for abdominal pain, blood in stool, diarrhea, nausea and vomiting.   Genitourinary:  Negative for dysuria, flank pain and urgency.   Musculoskeletal:  Negative for back pain, joint pain, myalgias and neck pain.   Skin:  Negative for rash.   Neurological:  Negative for dizziness, focal weakness, seizures and headaches.   Endo/Heme/Allergies:  Does not bruise/bleed easily.   Psychiatric/Behavioral:  Negative for suicidal ideas.    All other systems reviewed and are negative.    Past Medical History  Prostate cancer, diabetes mellitus, BPH    Surgical History  No pertinent surgical history    Family History     Family history reviewed with patient. There is no family history that is pertinent to the chief complaint.     Social History   reports that he has quit smoking. His smoking use included cigarettes. He has never used smokeless tobacco. He reports that he does not currently use alcohol. He reports that he does not currently use drugs.    Allergies  No Known Allergies    Medications  Prior to Admission Medications   Prescriptions Last Dose Informant Patient Reported? Taking?   albuterol (PROVENTIL) 2.5mg/0.5ml Nebu Soln PRN at PRN Patient's Home Pharmacy Yes Yes   Sig: Take 2.5 mg by nebulization every 6 hours as needed for Shortness of Breath.   docusate calcium (SURFAK) 240 MG Cap PRN at PRN Patient's Home Pharmacy Yes Yes   Sig: Take 240 mg by mouth 1 time a day as needed for Constipation.    insulin glargine 100 UNIT/ML SC SOLN 3/27/2023 at PM Patient's Home Pharmacy Yes Yes   Sig: Inject 20 Units under the skin every evening.   metFORMIN (GLUCOPHAGE) 500 MG Tab 3/27/2023 at PM Patient's Home Pharmacy Yes Yes   Sig: Take 1,000 mg by mouth 2 times a day with meals.   tamsulosin (FLOMAX) 0.4 MG capsule 3/27/2023 at UNK Patient's Home Pharmacy Yes Yes   Sig: Take 0.4 mg by mouth every day.      Facility-Administered Medications: None       Physical Exam  Temp:  [36.3 °C (97.3 °F)] 36.3 °C (97.3 °F)  Pulse:  [104-117] 109  Resp:  [10-20] 16  BP: ()/(53-71) 98/53  SpO2:  [73 %-98 %] 97 %  Blood Pressure : 98/53   Temperature: 36.3 °C (97.3 °F)   Pulse: (!) 109   Respiration: 16   Pulse Oximetry: 97 %       Physical Exam  Vitals and nursing note reviewed.   Constitutional:       General: He is not in acute distress.     Appearance: Normal appearance.   HENT:      Head: Normocephalic and atraumatic.      Nose: Nose normal.      Mouth/Throat:      Mouth: Mucous membranes are moist.   Eyes:      Extraocular Movements: Extraocular movements intact.      Conjunctiva/sclera: Conjunctivae normal.      Pupils: Pupils are equal, round, and reactive to light.   Cardiovascular:      Rate and Rhythm: Regular rhythm. Tachycardia present.      Pulses: Normal pulses.      Heart sounds: Normal heart sounds.   Pulmonary:      Effort: Respiratory distress present.      Breath sounds: Rales present. No wheezing or rhonchi.   Abdominal:      General: Bowel sounds are normal. There is no distension.      Palpations: Abdomen is soft.      Tenderness: There is no abdominal tenderness.   Musculoskeletal:         General: No swelling or tenderness. Normal range of motion.      Cervical back: Normal range of motion and neck supple.   Lymphadenopathy:      Cervical: No cervical adenopathy.   Skin:     General: Skin is warm.      Coloration: Skin is not jaundiced.      Findings: No rash.   Neurological:      General: No focal  deficit present.      Mental Status: He is alert and oriented to person, place, and time.      Cranial Nerves: No cranial nerve deficit.      Motor: No weakness.   Psychiatric:         Mood and Affect: Mood normal.         Behavior: Behavior normal.       Laboratory:  Recent Labs     03/28/23  1228   WBC 14.2*   RBC 4.56*   HEMOGLOBIN 12.7*   HEMATOCRIT 39.7*   MCV 87.1   MCH 27.9   MCHC 32.0*   RDW 45.5   PLATELETCT 301   MPV 10.9     Recent Labs     03/28/23  1228   SODIUM 137   POTASSIUM 6.3*   CHLORIDE 97   CO2 29   GLUCOSE 164*   BUN 20   CREATININE 0.90   CALCIUM 9.0     Recent Labs     03/28/23  1228   ALTSGPT 38   ASTSGOT 38   ALKPHOSPHAT 59   TBILIRUBIN 0.4   GLUCOSE 164*         Recent Labs     03/28/23  1228   NTPROBNP 2076*         No results for input(s): TROPONINT in the last 72 hours.    Imaging:  DX-CHEST-PORTABLE (1 VIEW)   Final Result      1.  Diffuse increased interstitial markings throughout the lung fields consistent with interstitial edema, pneumonitis, or parenchymal scarring.              Assessment/Plan:  Justification for Admission Status  I anticipate this patient will require at least two midnights for appropriate medical management, necessitating inpatient admission because acute respiratory failure with hypoxia and sepsis    Patient will need a Telemetry bed on MEDICAL service .  The need is secondary to hyperkalemia.    * Acute on chronic respiratory failure with hypoxia (HCC)- (present on admission)  Assessment & Plan  Secondary to pneumonia  Patient is on 3 L of oxygen by nasal cannula  RT protocol  Check 2D echo    Sepsis (HCC)- (present on admission)  Assessment & Plan  This is Sepsis Present on admission  SIRS criteria identified on my evaluation include: Tachycardia, with heart rate greater than 90 BPM, Tachypnea, with respirations greater than 20 per minute and Leukocytosis, with WBC greater than 12,000  Source is pneumonia  Sepsis protocol initiated  Fluid resuscitation  ordered per protocol  Crystalloid Fluid Administration: Fluid resuscitation ordered per standard protocol - 30 mL/kg per current or ideal body weight  IV antibiotics as appropriate for source of sepsis  Reassessment: I have reassessed the patient's hemodynamic status          Pneumonia  Assessment & Plan  mildy elevated WBC and procal with infiltrates on CXR  Started on Ceftriaxone and azithromycin  Monitor cbc and procal    Type 2 diabetes mellitus with hyperglycemia, with long-term current use of insulin (HCC)- (present on admission)  Assessment & Plan  Uncontrolled with hyperglycemia of 713  Ordered IV insulin regular 5 units once  Start on high dose insulin sliding scale with serial Accu-Checks  Continue Lantus 20  Check hemoglobin A1c  Hypoglycemic protocol in place        Prostate cancer (HCC)- (present on admission)  Assessment & Plan  Patient is not interested in pursuing treatment for this    BPH (benign prostatic hyperplasia)- (present on admission)  Assessment & Plan  Continue Flomax    Hyperkalemia- (present on admission)  Assessment & Plan  Likely lab error  Repeat BMP shows K 4.6  Monitor BMP        VTE prophylaxis: enoxaparin ppx

## 2023-03-29 ENCOUNTER — APPOINTMENT (OUTPATIENT)
Dept: RADIOLOGY | Facility: MEDICAL CENTER | Age: 78
DRG: 871 | End: 2023-03-29
Payer: COMMERCIAL

## 2023-03-29 ENCOUNTER — APPOINTMENT (OUTPATIENT)
Dept: CARDIOLOGY | Facility: MEDICAL CENTER | Age: 78
DRG: 871 | End: 2023-03-29
Payer: COMMERCIAL

## 2023-03-29 PROBLEM — Z51.5 PATIENT ON FULL HOSPICE CARE: Status: ACTIVE | Noted: 2023-03-29

## 2023-03-29 PROBLEM — J18.9 PNEUMONIA: Status: ACTIVE | Noted: 2023-03-29

## 2023-03-29 LAB
ANION GAP SERPL CALC-SCNC: 8 MMOL/L (ref 7–16)
B PARAP IS1001 DNA NPH QL NAA+NON-PROBE: NOT DETECTED
B PERT.PT PRMT NPH QL NAA+NON-PROBE: NOT DETECTED
BASOPHILS # BLD AUTO: 0.4 % (ref 0–1.8)
BASOPHILS # BLD: 0.06 K/UL (ref 0–0.12)
BUN SERPL-MCNC: 20 MG/DL (ref 8–22)
C PNEUM DNA NPH QL NAA+NON-PROBE: NOT DETECTED
CALCIUM SERPL-MCNC: 8.3 MG/DL (ref 8.5–10.5)
CHLORIDE SERPL-SCNC: 99 MMOL/L (ref 96–112)
CO2 SERPL-SCNC: 29 MMOL/L (ref 20–33)
CREAT SERPL-MCNC: 0.62 MG/DL (ref 0.5–1.4)
EOSINOPHIL # BLD AUTO: 0.09 K/UL (ref 0–0.51)
EOSINOPHIL NFR BLD: 0.6 % (ref 0–6.9)
ERYTHROCYTE [DISTWIDTH] IN BLOOD BY AUTOMATED COUNT: 45.4 FL (ref 35.9–50)
ERYTHROCYTE [DISTWIDTH] IN BLOOD BY AUTOMATED COUNT: 45.7 FL (ref 35.9–50)
EST. AVERAGE GLUCOSE BLD GHB EST-MCNC: 126 MG/DL
FLUAV RNA NPH QL NAA+NON-PROBE: NOT DETECTED
FLUBV RNA NPH QL NAA+NON-PROBE: NOT DETECTED
GFR SERPLBLD CREATININE-BSD FMLA CKD-EPI: 99 ML/MIN/1.73 M 2
GLUCOSE BLD STRIP.AUTO-MCNC: 148 MG/DL (ref 65–99)
GLUCOSE BLD STRIP.AUTO-MCNC: 158 MG/DL (ref 65–99)
GLUCOSE BLD STRIP.AUTO-MCNC: 172 MG/DL (ref 65–99)
GLUCOSE BLD STRIP.AUTO-MCNC: 177 MG/DL (ref 65–99)
GLUCOSE BLD STRIP.AUTO-MCNC: 62 MG/DL (ref 65–99)
GLUCOSE BLD STRIP.AUTO-MCNC: 98 MG/DL (ref 65–99)
GLUCOSE SERPL-MCNC: 114 MG/DL (ref 65–99)
HADV DNA NPH QL NAA+NON-PROBE: NOT DETECTED
HBA1C MFR BLD: 6 % (ref 4–5.6)
HCOV 229E RNA NPH QL NAA+NON-PROBE: NOT DETECTED
HCOV HKU1 RNA NPH QL NAA+NON-PROBE: NOT DETECTED
HCOV NL63 RNA NPH QL NAA+NON-PROBE: NOT DETECTED
HCOV OC43 RNA NPH QL NAA+NON-PROBE: NOT DETECTED
HCT VFR BLD AUTO: 32.9 % (ref 42–52)
HCT VFR BLD AUTO: 33.3 % (ref 42–52)
HGB BLD-MCNC: 10.5 G/DL (ref 14–18)
HGB BLD-MCNC: 10.5 G/DL (ref 14–18)
HMPV RNA NPH QL NAA+NON-PROBE: NOT DETECTED
HPIV1 RNA NPH QL NAA+NON-PROBE: NOT DETECTED
HPIV2 RNA NPH QL NAA+NON-PROBE: NOT DETECTED
HPIV3 RNA NPH QL NAA+NON-PROBE: NOT DETECTED
HPIV4 RNA NPH QL NAA+NON-PROBE: NOT DETECTED
IMM GRANULOCYTES # BLD AUTO: 0.09 K/UL (ref 0–0.11)
IMM GRANULOCYTES NFR BLD AUTO: 0.6 % (ref 0–0.9)
LACTATE SERPL-SCNC: 1.9 MMOL/L (ref 0.5–2)
LV EJECT FRACT MOD 2C 99903: 45.54
LV EJECT FRACT MOD 4C 99902: 45.64
LV EJECT FRACT MOD BP 99901: 46.03
LYMPHOCYTES # BLD AUTO: 1.44 K/UL (ref 1–4.8)
LYMPHOCYTES NFR BLD: 9.6 % (ref 22–41)
M PNEUMO DNA NPH QL NAA+NON-PROBE: NOT DETECTED
MCH RBC QN AUTO: 27.5 PG (ref 27–33)
MCH RBC QN AUTO: 28.2 PG (ref 27–33)
MCHC RBC AUTO-ENTMCNC: 31.5 G/DL (ref 33.7–35.3)
MCHC RBC AUTO-ENTMCNC: 31.9 G/DL (ref 33.7–35.3)
MCV RBC AUTO: 87.2 FL (ref 81.4–97.8)
MCV RBC AUTO: 88.2 FL (ref 81.4–97.8)
MONOCYTES # BLD AUTO: 1.58 K/UL (ref 0–0.85)
MONOCYTES NFR BLD AUTO: 10.6 % (ref 0–13.4)
NEUTROPHILS # BLD AUTO: 11.67 K/UL (ref 1.82–7.42)
NEUTROPHILS NFR BLD: 78.2 % (ref 44–72)
NRBC # BLD AUTO: 0 K/UL
NRBC BLD-RTO: 0 /100 WBC
PLATELET # BLD AUTO: 237 K/UL (ref 164–446)
PLATELET # BLD AUTO: 246 K/UL (ref 164–446)
PMV BLD AUTO: 10.2 FL (ref 9–12.9)
PMV BLD AUTO: 11.4 FL (ref 9–12.9)
POTASSIUM SERPL-SCNC: 4.5 MMOL/L (ref 3.6–5.5)
RBC # BLD AUTO: 3.73 M/UL (ref 4.7–6.1)
RBC # BLD AUTO: 3.82 M/UL (ref 4.7–6.1)
RSV RNA NPH QL NAA+NON-PROBE: NOT DETECTED
RV+EV RNA NPH QL NAA+NON-PROBE: NOT DETECTED
SARS-COV-2 RNA NPH QL NAA+NON-PROBE: NOTDETECTED
SODIUM SERPL-SCNC: 136 MMOL/L (ref 135–145)
WBC # BLD AUTO: 14.9 K/UL (ref 4.8–10.8)
WBC # BLD AUTO: 14.9 K/UL (ref 4.8–10.8)

## 2023-03-29 PROCEDURE — 83605 ASSAY OF LACTIC ACID: CPT

## 2023-03-29 PROCEDURE — 80048 BASIC METABOLIC PNL TOTAL CA: CPT

## 2023-03-29 PROCEDURE — 87486 CHLMYD PNEUM DNA AMP PROBE: CPT

## 2023-03-29 PROCEDURE — 93306 TTE W/DOPPLER COMPLETE: CPT | Mod: 26 | Performed by: STUDENT IN AN ORGANIZED HEALTH CARE EDUCATION/TRAINING PROGRAM

## 2023-03-29 PROCEDURE — 700102 HCHG RX REV CODE 250 W/ 637 OVERRIDE(OP): Performed by: INTERNAL MEDICINE

## 2023-03-29 PROCEDURE — 87581 M.PNEUMON DNA AMP PROBE: CPT

## 2023-03-29 PROCEDURE — 700111 HCHG RX REV CODE 636 W/ 250 OVERRIDE (IP): Performed by: INTERNAL MEDICINE

## 2023-03-29 PROCEDURE — 71250 CT THORAX DX C-: CPT

## 2023-03-29 PROCEDURE — 770020 HCHG ROOM/CARE - TELE (206)

## 2023-03-29 PROCEDURE — 93306 TTE W/DOPPLER COMPLETE: CPT

## 2023-03-29 PROCEDURE — 87633 RESP VIRUS 12-25 TARGETS: CPT

## 2023-03-29 PROCEDURE — 85027 COMPLETE CBC AUTOMATED: CPT

## 2023-03-29 PROCEDURE — 85025 COMPLETE CBC W/AUTO DIFF WBC: CPT

## 2023-03-29 PROCEDURE — 71045 X-RAY EXAM CHEST 1 VIEW: CPT

## 2023-03-29 PROCEDURE — 82962 GLUCOSE BLOOD TEST: CPT | Mod: 91

## 2023-03-29 PROCEDURE — 700105 HCHG RX REV CODE 258: Performed by: INTERNAL MEDICINE

## 2023-03-29 PROCEDURE — 99233 SBSQ HOSP IP/OBS HIGH 50: CPT | Mod: GC | Performed by: HOSPITALIST

## 2023-03-29 PROCEDURE — 83036 HEMOGLOBIN GLYCOSYLATED A1C: CPT

## 2023-03-29 PROCEDURE — A9270 NON-COVERED ITEM OR SERVICE: HCPCS | Performed by: INTERNAL MEDICINE

## 2023-03-29 PROCEDURE — 87798 DETECT AGENT NOS DNA AMP: CPT | Mod: 91

## 2023-03-29 PROCEDURE — 700111 HCHG RX REV CODE 636 W/ 250 OVERRIDE (IP)

## 2023-03-29 PROCEDURE — 36415 COLL VENOUS BLD VENIPUNCTURE: CPT

## 2023-03-29 RX ADMIN — SODIUM CHLORIDE: 9 INJECTION, SOLUTION INTRAVENOUS at 12:01

## 2023-03-29 RX ADMIN — SENNOSIDES AND DOCUSATE SODIUM 2 TABLET: 50; 8.6 TABLET ORAL at 17:29

## 2023-03-29 RX ADMIN — ENOXAPARIN SODIUM 40 MG: 100 INJECTION SUBCUTANEOUS at 17:29

## 2023-03-29 RX ADMIN — SENNOSIDES AND DOCUSATE SODIUM 2 TABLET: 50; 8.6 TABLET ORAL at 06:07

## 2023-03-29 RX ADMIN — INSULIN HUMAN 3 UNITS: 100 INJECTION, SOLUTION PARENTERAL at 17:29

## 2023-03-29 RX ADMIN — CEFTRIAXONE SODIUM 1000 MG: 10 INJECTION, POWDER, FOR SOLUTION INTRAVENOUS at 12:00

## 2023-03-29 RX ADMIN — DEXTROSE MONOHYDRATE 25 G: 100 INJECTION, SOLUTION INTRAVENOUS at 00:59

## 2023-03-29 RX ADMIN — AZITHROMYCIN MONOHYDRATE 500 MG: 250 TABLET ORAL at 06:07

## 2023-03-29 RX ADMIN — CEFTRIAXONE SODIUM 1000 MG: 10 INJECTION, POWDER, FOR SOLUTION INTRAVENOUS at 06:07

## 2023-03-29 RX ADMIN — INSULIN HUMAN 3 UNITS: 100 INJECTION, SOLUTION PARENTERAL at 21:14

## 2023-03-29 RX ADMIN — TAMSULOSIN HYDROCHLORIDE 0.4 MG: 0.4 CAPSULE ORAL at 06:07

## 2023-03-29 ASSESSMENT — PAIN DESCRIPTION - PAIN TYPE: TYPE: ACUTE PAIN

## 2023-03-29 NOTE — PROGRESS NOTES
4 Eyes Skin Assessment Completed by JUAN Andrade and JUAN Garg.    Head WDL  Ears WDL  Nose WDL  Mouth WDL  Neck WDL  Breast/Chest WDL  Shoulder Blades WDL  Spine WDL  (R) Arm/Elbow/Hand WDL  (L) Arm/Elbow/Hand WDL  Abdomen WDL  Groin WDL  Scrotum/Coccyx/Buttocks WDL  (R) Leg WDL  (L) Leg WDL  (R) Heel/Foot/Toe WDL  (L) Heel/Foot/Toe WDL          Devices In Places Nasal Cannula      Interventions In Place N/A    Possible Skin Injury No    Pictures Uploaded Into Epic N/A  Wound Consult Placed N/A  RN Wound Prevention Protocol Ordered No

## 2023-03-29 NOTE — RESPIRATORY CARE
"COPD EDUCATION by COPD CLINICAL EDUCATOR  3/29/2023  at  4:53 PM by Debbi Hodgson, RRT     Patient interviewed by COPD education team.  Patient unable to participate in full program. Patient uses albuterol nebulizer at home and states he has COPD/ILD. Went over nebulizer use and states he has been on hospice over the last year. [facility] and is not established with a pulmonologist.     COPD Screen  COPD Risk Screening  Do you have a history of COPD?: Yes  Do you have a Pulmonologist?: Yes    COPD Assessment  COPD Clinical Specialists ONLY  COPD Education Initiated: Yes--Short Intervention (patient states he has COPD and ILD. uses nebulizer on occasion and quit smoking. patient states hes been on hospice for a year now and is a [facility].)  DME Company: none  DME Equipment Type: none  [facility]  [facility]ologist [facility]  Referrals Initiated: Yes  Pulmonary Rehab: N/A  Smoking Cessation: N/A  Palliative Care:  (recommended)  Hospice: Yes  Home Health Care: N/A  [facility] Outreach: N/A  [facility]: N/A  [facility]: N/A  Private In-Home Care Agency: N/A  Is this a COPD exacerbation patient?: No    Meds to Beds  Would the patient like to opt in for Bedside Medication Delivery at Discharge?: Yes, interested     MY COPD ACTION PLAN     It is recommended that patients and physicians /healthcare providers complete this action plan together. This plan should be discussed at each physician visit and updated as needed.    The green, yellow and red zones show groups of symptoms of COPD. This list of symptoms is not comprehensive, and you may experience other symptoms. In the \"Actions\" column, your healthcare provider has recommended actions for you to take based on your symptoms.    Patient Name: Blair Bah   YOB: 1945   Last Updated on: 3/29/2023  4:52 PM   Green Zone:  I am doing well today Actions     Usual activitiy and " "exercise level   Take daily medications     Usual amounts of cough and phlegm/mucus   Use oxygen as prescribed     Sleep well at night   Continue regular exercise/diet plan     Appetite is good   At all times avoid cigarette smoke, inhaled irritants     Daily Medications (these medications are taken every day):                Yellow Zone:  I am having a bad day or a COPD flare Actions     More breathless than usual   Continue daily medications     I have less energy for my daily activities   Use quick relief inhaler as ordered     Increased or thicker phlegm/mucus   Use oxygen as prescribed     Using quick relief inhaler/nebulizer more often   Get plenty of rest     Swelling of ankles more than usual   Use pursed lip breathing     More coughing than usual   At all times avoid cigarette smoke, inhaled irritants     I feel like I have a \"chest cold\"     Poor sleep and my symptoms woke me up     My appetite is not good     My medicine is not helping      Call provider immediately if symptoms don’t improve     Continue daily medications, add rescue medications:   Albuterol/Ipratropium (Combivent, Duoneb) 3mL via nebulizer Every 6 hours PRN       Medications to be used during a flare up, (as Discussed with Provider):           Additional Information:  Rinse nebulizer after use    Red Zone:  I need urgent medical care Actions     Severe shortness of breath even at rest   Call 911 or seek medical care immediately     Not able to do any activity because of breathing      Fever or shaking chills      Feeling confused or very drowsy       Chest pains      Coughing up blood                  "

## 2023-03-29 NOTE — CARE PLAN
The patient is Watcher - Medium risk of patient condition declining or worsening    Shift Goals  Clinical Goals: Monitor BG  Patient Goals: res    Progress made toward(s) clinical / shift goals:    Problem: Respiratory  Goal: Patient will achieve/maintain optimum respiratory ventilation and gas exchange  Outcome: Progressing  Note: Pt on O2 via NC as needed     Problem: Mechanical Ventilation  Goal: Patient will be able to express needs and understand communication  Outcome: Progressing  Note: Pt able to express needs       Patient is not progressing towards the following goals:

## 2023-03-29 NOTE — ASSESSMENT & PLAN NOTE
Mildly elevated WBC and procal with nonspecific infiltrates on CXR. WBC and lactate increasing on admission day 2, abx changed to appropriate dosed.  - on ceftriaxone (3/28/23-4/1/23), s/p azithromycin (3/28-30/23)  - trend labs and s/s for improvement

## 2023-03-29 NOTE — PROGRESS NOTES
[facility] DAILY PROGRESS NOTE    Date of Service: 3/29/2023    Primary Team: [facility] IM Purple Team   Attending: JAMI Meehan M.D.   Senior Resident: Dr. Law  Intern: Agustina Cm M.D.  Contact:  [phone]    Patient ID:  Name: Blair Bah  YOB: 1945  Code Status: DNAR/DNI    Chief Complaint(s):  ALOC (Pt's caregiver called for a wellness check after not being able to get ahold of patient by telephone. LKW 0030 this morning. Pt was 75% on RA upon EMS arrival. Pt is supposed to wear oxygen at all times at home. Pt was not wearing his oxygen and was lethargic. Pt is on hospice for prostate cancer, pt states he is not receiving chemotherapy. Pt presents with POLST. Pt's only complaint is right arm pain. Pt denies falls. )    Hospital Course:  No notes on file    Interval Update (3/29/2023):  Patient seen this morning at bedside. He reports that he is feeling better than yesterday. CXR, CT chest, and echo pending to further evaluate patient's presenting symptoms. Continue abx at appropriate dosing. Continue to monitor. Of note, hospice is aware that he is inpatient at this time.    Consultants/Specialty:  hospice      A representative from my team or myself have discussed this patient's plan of care and discharge plan at IDT rounds today with Case Management, Nursing, Nursing leadership, and other members of the IDT team.    Disposition:  Patient is not medically cleared for discharge.   Anticipate discharge to to hospice.  I have placed the appropriate orders for post-discharge needs.    Review of Systems:    Constitutional: Denies fevers, denies chills, denies weight changes, + fatigue  EENT: Denies vision changes, denies nasal congestion, denies sore throat   Cardiology: Denies CP, denies palpitations, denies orthopnea  Respiratory: Denies SOB, denies cough  Gastroenterology: Denies abdomen pain, denies N/V/C/D, denies blood in stool  Genitourinary: Denies  dysuria, denies changes in frequency, denies blood in urine  MSK/Rheum: Denies myalgias, denies joint pain, denies joint swelling  Skin: Denies rash, denies itching, denies ecchymosis  Neurology: Denies headache, denies tingling, denies tremors, + weakness  Psych: Denies SI/HI, denies hallucinations,   Endo/Heme/Onc: Denies thyroid problems, denies bleeding easily      PHYSICAL EXAM:  Vitals:    03/29/23 0000 03/29/23 0418 03/29/23 0738 03/29/23 1057   BP: (!) 152/128 (!) 87/62 (!) 84/56 96/58   Pulse: (!) 123 92 85 97   Resp: 20 18 18 16   Temp: 37.3 °C (99.1 °F) 36.4 °C (97.6 °F) 36.8 °C (98.2 °F) 37.2 °C (99 °F)   TempSrc: Temporal Temporal Temporal Oral   SpO2: 89% 95% 98% 96%   Weight:       Height:        Body mass index is 24.41 kg/m².  Oxygen Therapy: Pulse Oximetry: 96 %, O2 (LPM): 3, O2 Delivery Device: Silicone Nasal Cannula    Intake/Output Summary (Last 24 hours) at 3/29/2023 1058  Last data filed at 3/29/2023 0418  Gross per 24 hour   Intake --   Output 400 ml   Net -400 ml       Physical Exam  Constitutional:       General: He is not in acute distress.     Appearance: He is ill-appearing.   HENT:      Head: Normocephalic and atraumatic.      Nose: Nose normal.      Mouth/Throat:      Mouth: Mucous membranes are moist.      Pharynx: Oropharynx is clear.   Eyes:      Extraocular Movements: Extraocular movements intact.   Cardiovascular:      Rate and Rhythm: Regular rhythm. Tachycardia present.      Pulses: Normal pulses.   Pulmonary:      Effort: Pulmonary effort is normal. No respiratory distress.      Comments: BL fine crackles up 1/3 of lung volume  Abdominal:      General: Abdomen is flat. Bowel sounds are normal.   Genitourinary:     Comments: Bowman in place  Musculoskeletal:         General: Normal range of motion.      Cervical back: Normal range of motion.      Right lower leg: No edema.      Left lower leg: No edema.   Skin:     General: Skin is warm and dry.   Neurological:      General: No  focal deficit present.      Mental Status: He is alert and oriented to person, place, and time.   Psychiatric:         Mood and Affect: Mood normal.         Behavior: Behavior normal.       Laboratory Review:  Recent Labs     03/28/23  1228 03/29/23  0213 03/29/23  1142   WBC 14.2* 14.9* 14.9*   RBC 4.56* 3.82* 3.73*   HEMOGLOBIN 12.7* 10.5* 10.5*   HEMATOCRIT 39.7* 33.3* 32.9*   MCV 87.1 87.2 88.2   MCH 27.9 27.5 28.2   MCHC 32.0* 31.5* 31.9*   RDW 45.5 45.4 45.7   PLATELETCT 301 246 237   MPV 10.9 11.4 10.2     Recent Labs     03/28/23  1228 03/28/23  1554 03/29/23  0213   SODIUM 137 120* 136   POTASSIUM 6.3* 4.6 4.5   CHLORIDE 97 86* 99   CO2 29 21 29   BUN 20 21 20   CREATININE 0.90 0.72 0.62   CALCIUM 9.0 7.2* 8.3*     Recent Labs     03/28/23  1228 03/28/23  1554 03/29/23  0213   ALTSGPT 38  --   --    ASTSGOT 38  --   --    ALKPHOSPHAT 59  --   --    TBILIRUBIN 0.4  --   --    GLUCOSE 164* 713* 114*               Imaging/Procedures Review:    DX-CHEST-PORTABLE (1 VIEW)   Final Result      Stable moderate interstitial pulmonary edema.      DX-CHEST-PORTABLE (1 VIEW)   Final Result      1.  Diffuse increased interstitial markings throughout the lung fields consistent with interstitial edema, pneumonitis, or parenchymal scarring.      EC-ECHOCARDIOGRAM COMPLETE W/O CONT    (Results Pending)   CT-CHEST (THORAX) W/O    (Results Pending)         ASSESSMENT & PLAN via Problem Representation:  * Acute on chronic respiratory failure with hypoxia (HCC)- (present on admission)  Assessment & Plan  Likely secondary to pneumonia. On 2L of o2 at baseline, required 3L in the ED.  - RT protocol  - treat possible infection  - IVF for now  - echo pending  - wean off o2 as tolerated    Pneumonia- (present on admission)  Assessment & Plan  Mildly elevated WBC and procal with nonspecific infiltrates on CXR. WBC and lactate increasing on admission day 2, abx changed to appropriate dosed.  - on ceftriaxone (3/28/23-4/1/23),  "azithromycin (3/28-30/23)  - trend labs and s/s for improvement    Sepsis (HCC)- (present on admission)  Assessment & Plan  This is Sepsis Present on admission  SIRS criteria identified on my evaluation include: Tachycardia, with heart rate greater than 90 BPM, Tachypnea, with respirations greater than 20 per minute and Leukocytosis, with WBC greater than 12,000  Source is pneumonia  Sepsis protocol initiated  Fluid resuscitation ordered per protocol  Crystalloid Fluid Administration: Fluid resuscitation ordered per standard protocol - 30 mL/kg per current or ideal body weight  IV antibiotics as appropriate for source of sepsis  Reassessment: I have reassessed the patient's hemodynamic status    See \"Pneumonia' problem for additional information.    Patient on full hospice care  Assessment & Plan  Per chart review. Hospice team is aware of inpatient status.      Type 2 diabetes mellitus with hyperglycemia, with long-term current use of insulin ([facility])- (present on admission)  Assessment & Plan  Blood glucose 713 at admission, likely lab error given other values and no further evaluated values despite minimal therapy.  - a1c pending  - SSI  - add glargine as clinically indicated  - hypoglycemic protocol    Prostate cancer ([facility])- (present on admission)  Assessment & Plan  Patient is on hospice and is not interested in pursuing treatment for this.    Hyperkalemia- (present on admission)  Assessment & Plan  Likely lab error as repeat labs are more WNL.  - monitor    BPH (benign prostatic hyperplasia)- (present on admission)  Assessment & Plan  - Continue home Flomax        VTE prophylaxis: enoxaparin ppx  Bowman/Tubes/Lines/Drains: PIV L arm, PIV L wrist  Nutrition/Diet Orders:   Orders Placed This Encounter   Procedures    Diet Order Diet: Consistent CHO (Diabetic); Nutrient modifications: (optional): Low Potassium     Standing Status:   Standing     Number of Occurrences:   1     Order Specific Question:   Diet:     " Answer:   Consistent CHO (Diabetic) [4]     Order Specific Question:   Nutrient modifications: (optional)     Answer:   Low Potassium [11]       Thank you very much for allowing me to participate in this patient's care. I have performed a physical exam and reviewed and updated ROS and Plan today (3/29/2023). In review of yesterday's note (3/28/2023), there are no changes except as documented above. All plans of care were discussed with the attending physician. Please contact me with any questions.    Agustina Cm M.D., PGY1

## 2023-03-29 NOTE — PROGRESS NOTES
Hypoglycemia Intervention    Hypoglycemia protocol intervention:  Blood glucose 62 at 0022.  Intervention: 25 g IV dextrose per MAR   Repeat blood glucose 148 at 0133.  Additional interventions needed: None  Max Davies notified of the above at 0033.

## 2023-03-30 PROBLEM — J84.9 INTERSTITIAL LUNG DISEASE (HCC): Status: ACTIVE | Noted: 2023-03-30

## 2023-03-30 PROBLEM — E83.39 HYPOPHOSPHATEMIA: Status: ACTIVE | Noted: 2023-03-30

## 2023-03-30 LAB
ALBUMIN SERPL BCP-MCNC: 2.8 G/DL (ref 3.2–4.9)
ALBUMIN/GLOB SERPL: 0.9 G/DL
ALP SERPL-CCNC: 45 U/L (ref 30–99)
ALT SERPL-CCNC: 51 U/L (ref 2–50)
ANION GAP SERPL CALC-SCNC: 8 MMOL/L (ref 7–16)
AST SERPL-CCNC: 48 U/L (ref 12–45)
BASOPHILS # BLD AUTO: 0.5 % (ref 0–1.8)
BASOPHILS # BLD: 0.07 K/UL (ref 0–0.12)
BILIRUB SERPL-MCNC: 0.3 MG/DL (ref 0.1–1.5)
BUN SERPL-MCNC: 13 MG/DL (ref 8–22)
CALCIUM ALBUM COR SERPL-MCNC: 9 MG/DL (ref 8.5–10.5)
CALCIUM SERPL-MCNC: 8 MG/DL (ref 8.5–10.5)
CHLORIDE SERPL-SCNC: 101 MMOL/L (ref 96–112)
CO2 SERPL-SCNC: 27 MMOL/L (ref 20–33)
CREAT SERPL-MCNC: 0.46 MG/DL (ref 0.5–1.4)
EOSINOPHIL # BLD AUTO: 0.16 K/UL (ref 0–0.51)
EOSINOPHIL NFR BLD: 1.2 % (ref 0–6.9)
ERYTHROCYTE [DISTWIDTH] IN BLOOD BY AUTOMATED COUNT: 46.3 FL (ref 35.9–50)
GFR SERPLBLD CREATININE-BSD FMLA CKD-EPI: 108 ML/MIN/1.73 M 2
GLOBULIN SER CALC-MCNC: 3 G/DL (ref 1.9–3.5)
GLUCOSE BLD STRIP.AUTO-MCNC: 122 MG/DL (ref 65–99)
GLUCOSE BLD STRIP.AUTO-MCNC: 131 MG/DL (ref 65–99)
GLUCOSE BLD STRIP.AUTO-MCNC: 97 MG/DL (ref 65–99)
GLUCOSE SERPL-MCNC: 104 MG/DL (ref 65–99)
HCT VFR BLD AUTO: 32.4 % (ref 42–52)
HGB BLD-MCNC: 10.1 G/DL (ref 14–18)
IMM GRANULOCYTES # BLD AUTO: 0.08 K/UL (ref 0–0.11)
IMM GRANULOCYTES NFR BLD AUTO: 0.6 % (ref 0–0.9)
LYMPHOCYTES # BLD AUTO: 1.95 K/UL (ref 1–4.8)
LYMPHOCYTES NFR BLD: 14.2 % (ref 22–41)
MAGNESIUM SERPL-MCNC: 1.6 MG/DL (ref 1.5–2.5)
MCH RBC QN AUTO: 27.6 PG (ref 27–33)
MCHC RBC AUTO-ENTMCNC: 31.2 G/DL (ref 33.7–35.3)
MCV RBC AUTO: 88.5 FL (ref 81.4–97.8)
MONOCYTES # BLD AUTO: 1.39 K/UL (ref 0–0.85)
MONOCYTES NFR BLD AUTO: 10.1 % (ref 0–13.4)
NEUTROPHILS # BLD AUTO: 10.08 K/UL (ref 1.82–7.42)
NEUTROPHILS NFR BLD: 73.4 % (ref 44–72)
NRBC # BLD AUTO: 0 K/UL
NRBC BLD-RTO: 0 /100 WBC
PHOSPHATE SERPL-MCNC: 1.8 MG/DL (ref 2.5–4.5)
PLATELET # BLD AUTO: 207 K/UL (ref 164–446)
PMV BLD AUTO: 10.9 FL (ref 9–12.9)
POTASSIUM SERPL-SCNC: 4.2 MMOL/L (ref 3.6–5.5)
PROT SERPL-MCNC: 5.8 G/DL (ref 6–8.2)
RBC # BLD AUTO: 3.66 M/UL (ref 4.7–6.1)
SODIUM SERPL-SCNC: 136 MMOL/L (ref 135–145)
WBC # BLD AUTO: 13.7 K/UL (ref 4.8–10.8)

## 2023-03-30 PROCEDURE — 700102 HCHG RX REV CODE 250 W/ 637 OVERRIDE(OP)

## 2023-03-30 PROCEDURE — 83735 ASSAY OF MAGNESIUM: CPT

## 2023-03-30 PROCEDURE — 82962 GLUCOSE BLOOD TEST: CPT | Mod: 91

## 2023-03-30 PROCEDURE — A9270 NON-COVERED ITEM OR SERVICE: HCPCS | Performed by: INTERNAL MEDICINE

## 2023-03-30 PROCEDURE — 99232 SBSQ HOSP IP/OBS MODERATE 35: CPT | Mod: GC | Performed by: HOSPITALIST

## 2023-03-30 PROCEDURE — 700102 HCHG RX REV CODE 250 W/ 637 OVERRIDE(OP): Performed by: INTERNAL MEDICINE

## 2023-03-30 PROCEDURE — A9270 NON-COVERED ITEM OR SERVICE: HCPCS

## 2023-03-30 PROCEDURE — 84100 ASSAY OF PHOSPHORUS: CPT

## 2023-03-30 PROCEDURE — 36415 COLL VENOUS BLD VENIPUNCTURE: CPT

## 2023-03-30 PROCEDURE — 770020 HCHG ROOM/CARE - TELE (206)

## 2023-03-30 PROCEDURE — 85025 COMPLETE CBC W/AUTO DIFF WBC: CPT

## 2023-03-30 PROCEDURE — 80053 COMPREHEN METABOLIC PANEL: CPT

## 2023-03-30 PROCEDURE — 700105 HCHG RX REV CODE 258: Performed by: INTERNAL MEDICINE

## 2023-03-30 PROCEDURE — 700111 HCHG RX REV CODE 636 W/ 250 OVERRIDE (IP): Performed by: INTERNAL MEDICINE

## 2023-03-30 PROCEDURE — 700111 HCHG RX REV CODE 636 W/ 250 OVERRIDE (IP)

## 2023-03-30 RX ORDER — MAGNESIUM SULFATE HEPTAHYDRATE 40 MG/ML
2 INJECTION, SOLUTION INTRAVENOUS ONCE
Status: COMPLETED | OUTPATIENT
Start: 2023-03-30 | End: 2023-03-30

## 2023-03-30 RX ADMIN — MAGNESIUM SULFATE HEPTAHYDRATE 2 G: 40 INJECTION, SOLUTION INTRAVENOUS at 09:09

## 2023-03-30 RX ADMIN — DIBASIC SODIUM PHOSPHATE, MONOBASIC POTASSIUM PHOSPHATE AND MONOBASIC SODIUM PHOSPHATE 250 MG: 852; 155; 130 TABLET ORAL at 17:25

## 2023-03-30 RX ADMIN — CEFTRIAXONE SODIUM 2000 MG: 10 INJECTION, POWDER, FOR SOLUTION INTRAVENOUS at 06:29

## 2023-03-30 RX ADMIN — ENOXAPARIN SODIUM 40 MG: 100 INJECTION SUBCUTANEOUS at 17:25

## 2023-03-30 RX ADMIN — DIBASIC SODIUM PHOSPHATE, MONOBASIC POTASSIUM PHOSPHATE AND MONOBASIC SODIUM PHOSPHATE 250 MG: 852; 155; 130 TABLET ORAL at 23:25

## 2023-03-30 RX ADMIN — SENNOSIDES AND DOCUSATE SODIUM 2 TABLET: 50; 8.6 TABLET ORAL at 17:24

## 2023-03-30 RX ADMIN — AZITHROMYCIN MONOHYDRATE 500 MG: 250 TABLET ORAL at 06:29

## 2023-03-30 RX ADMIN — DIBASIC SODIUM PHOSPHATE, MONOBASIC POTASSIUM PHOSPHATE AND MONOBASIC SODIUM PHOSPHATE 250 MG: 852; 155; 130 TABLET ORAL at 09:06

## 2023-03-30 RX ADMIN — SODIUM CHLORIDE: 9 INJECTION, SOLUTION INTRAVENOUS at 09:07

## 2023-03-30 RX ADMIN — DIBASIC SODIUM PHOSPHATE, MONOBASIC POTASSIUM PHOSPHATE AND MONOBASIC SODIUM PHOSPHATE 250 MG: 852; 155; 130 TABLET ORAL at 13:27

## 2023-03-30 RX ADMIN — TAMSULOSIN HYDROCHLORIDE 0.4 MG: 0.4 CAPSULE ORAL at 06:29

## 2023-03-30 ASSESSMENT — FIBROSIS 4 INDEX: FIB4 SCORE: 2.47

## 2023-03-30 ASSESSMENT — PATIENT HEALTH QUESTIONNAIRE - PHQ9
2. FEELING DOWN, DEPRESSED, IRRITABLE, OR HOPELESS: NOT AT ALL
1. LITTLE INTEREST OR PLEASURE IN DOING THINGS: NOT AT ALL
SUM OF ALL RESPONSES TO PHQ9 QUESTIONS 1 AND 2: 0

## 2023-03-30 ASSESSMENT — PAIN DESCRIPTION - PAIN TYPE
TYPE: ACUTE PAIN
TYPE: ACUTE PAIN

## 2023-03-30 NOTE — CARE PLAN
The patient is Stable - Low risk of patient condition declining or worsening    Shift Goals  Clinical Goals: Monitor BG  Patient Goals: Rest  Family Goals: DAISHA    Progress made toward(s) clinical / shift goals:    Problem: Knowledge Deficit - Standard  Goal: Patient and family/care givers will demonstrate understanding of plan of care, disease process/condition, diagnostic tests and medications  Outcome: Progressing  Note: Pt actively participates in POC. Pt updated, all questions and concerns answered. Pt encouraged to voice all questions and concerns.      Problem: Fluid Volume  Goal: Fluid volume balance will be maintained  Outcome: Progressing  Note: IV fluids in use, urine output WNL       Patient is not progressing towards the following goals:

## 2023-03-30 NOTE — ASSESSMENT & PLAN NOTE
With fibrosis. Seen on CT imaging during this admission. Patient is on hospice and does not want to persue treatments for non-reversible conditions.

## 2023-03-30 NOTE — CARE PLAN
The patient is Stable - Low risk of patient condition declining or worsening    Shift Goals  Clinical Goals: IV atb, o2 therapy , safety  Patient Goals: adequate rest  Family Goals: DAISHA    Progress made toward(s) clinical / shift goals:  IV antibiotic given, O2 at 2.5 LPM, no SOB/falls within shift, adequate rest     Patient is not progressing towards the following goals:

## 2023-03-30 NOTE — CARE PLAN
The patient is Stable - Low risk of patient condition declining or worsening    Shift Goals  Clinical Goals: Monitor BG  Patient Goals: Rest  Family Goals: DAISHA    Progress made toward(s) clinical / shift goals:    Problem: Knowledge Deficit - Standard  Goal: Patient and family/care givers will demonstrate understanding of plan of care, disease process/condition, diagnostic tests and medications  Outcome: Progressing   Patient will be able to explain plan of care by 3/30  Problem: Fluid Volume  Goal: Fluid volume balance will be maintained  Outcome: Progressing   Patient will maintain fluid volume through discharge    Patient is not progressing towards the following goals:

## 2023-03-30 NOTE — PROGRESS NOTES
[facility] DAILY PROGRESS NOTE    Date of Service: 3/30/2023    Primary Team: [facility]   Attending: JAMI Meehan M.D.   Senior Resident: Dr. Law  Intern: Agustina Cm M.D.  Contact:  [phone]    Patient ID:  Name: Blair Bah  YOB: 1945  Code Status: DNAR/DNI    Chief Complaint(s):  ALOC (Pt's caregiver called for a wellness check after not being able to get ahold of patient by telephone. LKW 0030 this morning. Pt was 75% on RA upon EMS arrival. Pt is supposed to wear oxygen at all times at home. Pt was not wearing his oxygen and was lethargic. Pt is on hospice for prostate cancer, pt states he is not receiving chemotherapy. Pt presents with POLST. Pt's only complaint is right arm pain. Pt denies falls. )    Hospital Course:  No notes on file    Interval Update (3/30/2023):  Patient seen this morning at bedside in no acute distress. CT chest suggestive of ILD, patient does not want to seek treatment for non-reversible conditions at this time. Will continue abx therapy. Potential discharge tomorrow depending on clinical improvement.    Consultants/Specialty:  hospice      A representative from my team or myself have discussed this patient's plan of care and discharge plan at IDT rounds today with Case Management, Nursing, Nursing leadership, and other members of the IDT team.    Disposition:  Patient is not medically cleared for discharge.   Anticipate discharge to to hospice.  I have placed the appropriate orders for post-discharge needs.    Review of Systems:    Constitutional: Denies fevers, denies chills, denies weight changes, + fatigue  EENT: Denies vision changes, denies nasal congestion, denies sore throat   Cardiology: Denies CP, denies palpitations, denies orthopnea  Respiratory: Denies SOB, denies cough  Gastroenterology: Denies abdomen pain, denies N/V/C/D, denies blood in stool  Genitourinary: Denies dysuria, denies changes in  frequency, denies blood in urine  MSK/Rheum: Denies myalgias, denies joint pain, denies joint swelling  Skin: Denies rash, denies itching, denies ecchymosis  Neurology: Denies headache, denies tingling, denies tremors, + weakness  Psych: Denies SI/HI, denies hallucinations,   Endo/Heme/Onc: Denies thyroid problems, denies bleeding easily      PHYSICAL EXAM:  Vitals:    03/29/23 2318 03/30/23 0900 03/30/23 0904 03/30/23 1330   BP: 98/58  100/65 105/66   Pulse: 89  85 84   Resp: 16  18    Temp: 37.1 °C (98.7 °F)  (!) 35.2 °C (95.3 °F) (!) 35.2 °C (95.4 °F)   TempSrc: Temporal  Temporal Temporal   SpO2: 97%  95% 96%   Weight:  79.4 kg (174 lb 15.7 oz)     Height:        Body mass index is 24.4 kg/m².  Oxygen Therapy: Pulse Oximetry: 96 %, O2 (LPM): 2.5, O2 Delivery Device: Nasal Cannula    Intake/Output Summary (Last 24 hours) at 3/29/2023 1058  Last data filed at 3/29/2023 0418  Gross per 24 hour   Intake --   Output 400 ml   Net -400 ml       Physical Exam  Constitutional:       General: He is not in acute distress.  HENT:      Head: Normocephalic and atraumatic.      Nose: Nose normal.      Mouth/Throat:      Mouth: Mucous membranes are moist.      Pharynx: Oropharynx is clear.   Eyes:      Extraocular Movements: Extraocular movements intact.   Cardiovascular:      Rate and Rhythm: Regular rhythm. Tachycardia present.      Pulses: Normal pulses.   Pulmonary:      Effort: Pulmonary effort is normal. No respiratory distress.      Comments: BL fine crackles up 1/3 of lung volume  Wheezing of R upper lung  Abdominal:      General: Abdomen is flat. Bowel sounds are normal.   Genitourinary:     Comments: Bowman in place  Musculoskeletal:         General: Normal range of motion.      Cervical back: Normal range of motion.      Right lower leg: No edema.      Left lower leg: No edema.   Skin:     General: Skin is warm and dry.   Neurological:      General: No focal deficit present.      Mental Status: He is alert and  oriented to person, place, and time.   Psychiatric:         Mood and Affect: Mood normal.         Behavior: Behavior normal.       Laboratory Review:  Recent Labs     03/29/23  0213 03/29/23  1142 03/30/23  0034   WBC 14.9* 14.9* 13.7*   RBC 3.82* 3.73* 3.66*   HEMOGLOBIN 10.5* 10.5* 10.1*   HEMATOCRIT 33.3* 32.9* 32.4*   MCV 87.2 88.2 88.5   MCH 27.5 28.2 27.6   MCHC 31.5* 31.9* 31.2*   RDW 45.4 45.7 46.3   PLATELETCT 246 237 207   MPV 11.4 10.2 10.9     Recent Labs     03/28/23  1554 03/29/23  0213 03/30/23  0034   SODIUM 120* 136 136   POTASSIUM 4.6 4.5 4.2   CHLORIDE 86* 99 101   CO2 21 29 27   BUN 21 20 13   CREATININE 0.72 0.62 0.46*   MAGNESIUM  --   --  1.6   PHOSPHORUS  --   --  1.8*   CALCIUM 7.2* 8.3* 8.0*     Recent Labs     03/28/23  1228 03/28/23  1554 03/29/23  0213 03/30/23  0034   ALTSGPT 38  --   --  51*   ASTSGOT 38  --   --  48*   ALKPHOSPHAT 59  --   --  45   TBILIRUBIN 0.4  --   --  0.3   GLUCOSE 164* 713* 114* 104*               Imaging/Procedures Review:    CT-CHEST (THORAX) W/O   Final Result         1.  Peripheral paraseptal emphysematous changes versus changes of fibrosis with honeycombing   2.  Diffuse hazy groundglass pulmonary opacities suggests component of pulmonary edema. Scattered hazy pulmonary opacities suggests component of superimposed infiltrate.   3.  Small bilateral dependent pleural effusions   4.  Atherosclerosis and atherosclerotic coronary artery disease   5.  Mediastinal adenopathy, consider causes of adenopathy with additional workup as clinically appropriate   6.  Small pericardial effusion      EC-ECHOCARDIOGRAM COMPLETE W/O CONT   Final Result      DX-CHEST-PORTABLE (1 VIEW)   Final Result      Stable moderate interstitial pulmonary edema.      DX-CHEST-PORTABLE (1 VIEW)   Final Result      1.  Diffuse increased interstitial markings throughout the lung fields consistent with interstitial edema, pneumonitis, or parenchymal scarring.            ASSESSMENT & PLAN via  "Problem Representation:  * Acute on chronic respiratory failure with hypoxia (HCC)- (present on admission)  Assessment & Plan  Likely secondary to pneumonia. On 2L of o2 at baseline, required 3L in the ED. Echo (3/29/23) EF 55%.  - RT protocol  - treat possible infection  - IVF for now  - wean off o2 as tolerated    Pneumonia- (present on admission)  Assessment & Plan  Mildly elevated WBC and procal with nonspecific infiltrates on CXR. WBC and lactate increasing on admission day 2, abx changed to appropriate dosed.  - on ceftriaxone (3/28/23-4/1/23), s/p azithromycin (3/28-30/23)  - trend labs and s/s for improvement    Sepsis (HCC)- (present on admission)  Assessment & Plan  This is Sepsis Present on admission  SIRS criteria identified on my evaluation include: Tachycardia, with heart rate greater than 90 BPM, Tachypnea, with respirations greater than 20 per minute and Leukocytosis, with WBC greater than 12,000  Source is pneumonia  Sepsis protocol initiated  Fluid resuscitation ordered per protocol  Crystalloid Fluid Administration: Fluid resuscitation ordered per standard protocol - 30 mL/kg per current or ideal body weight  IV antibiotics as appropriate for source of sepsis  Reassessment: I have reassessed the patient's hemodynamic status    See \"Pneumonia' problem for additional information.    Interstitial lung disease (HCC)  Assessment & Plan  With fibrosis. Seen on CT imaging during this admission. Patient is on hospice and does not want to persue treatments for non-reversible conditions.    Patient on full hospice care- (present on admission)  Assessment & Plan  Per chart review. Hospice team is aware of inpatient status.      Type 2 diabetes mellitus with hyperglycemia, with long-term current use of insulin (HCC)- (present on admission)  Assessment & Plan  Blood glucose 713 at admission, likely lab error given other values and no further evaluated values despite minimal therapy. A1c 6.0 (3/29/23).  - " SSI  - add glargine as clinically indicated  - hypoglycemic protocol    Prostate cancer (HCC)- (present on admission)  Assessment & Plan  Patient is on hospice and is not interested in pursuing treatment for this.    Hypophosphatemia- (present on admission)  Assessment & Plan  - monitor, replace as needed    BPH (benign prostatic hyperplasia)- (present on admission)  Assessment & Plan  - Continue home Flomax    Hyperkalemia- (present on admission)  Assessment & Plan  Likely lab error as repeat labs are more WNL.  - monitor        VTE prophylaxis: enoxaparin ppx  Bowman/Tubes/Lines/Drains: PIV L arm, PIV L wrist  Nutrition/Diet Orders:   Orders Placed This Encounter   Procedures    Diet Order Diet: Consistent CHO (Diabetic); Nutrient modifications: (optional): Low Potassium     Standing Status:   Standing     Number of Occurrences:   1     Order Specific Question:   Diet:     Answer:   Consistent CHO (Diabetic) [4]     Order Specific Question:   Nutrient modifications: (optional)     Answer:   Low Potassium [11]       Thank you very much for allowing me to participate in this patient's care. I have performed a physical exam and reviewed and updated ROS and Plan today (3/30/2023). In review of yesterday's note (3/29/2023), there are no changes except as documented above. All plans of care were discussed with the attending physician. Please contact me with any questions.    Agustina Cm M.D., PGY1

## 2023-03-30 NOTE — DOCUMENTATION QUERY
[facility]                                                                       Query Response Note      PATIENT:               RENETTA CHAND  ACCT #:                  [id number]  MRN:                     [id number]  :                      1945  ADMIT DATE:       3/28/2023 12:10 PM  DISCH DATE:          RESPONDING  PROVIDER #:        [id number]           QUERY TEXT:    Confusion is noted in the ER provider notes . Documentation indicates that patient's mentation improved once placed on supplemental O2.     Can the change in mentation be further clarified based on the above clinical indicators?    The patient's Clinical Indicators include:  Findings:  --Patient admitted for Sepsis d/t PNA and acute on chronic hypoxic respiratory failure  --Per ER provider notes , ''He was slightly confused, once we placed him on our high flow oxygen he      had a normal sensorium and a nonfocal neurological exam'' stated  --Per ER provider notes , ''constitutional:  alert and oriented x3, mild distress. Cranial nerves III through      XII are grossly intact, no sensory deficit, no cerebellar dysfunction'' stated    Treatment:  --Sepsis protocol with Rocephin and Zithromax, IVF  --Supplemental O2  --RT consult    Risk Factors:  --Sepsis  --Acute and chronic hypoxic respiratory failure  --Dependence on supplemental O2  --Prostate CA    Thank you,  Sirena Ghosh RN, BSN  Clinical   Connect via [employer]  Options provided:   -- Metabolic encephalopathy   -- Anoxic encephalopathy   -- Other encephalopathy, please specify   -- Other explanation, please specify   -- Unable to determine      Query created by: Sirena Ghosh on 3/29/2023 6:30 AM    RESPONSE TEXT:    Metabolic encephalopathy          Electronically signed by:  MATT ABRAHAM MD 3/30/2023 2:35 PM

## 2023-03-31 VITALS
WEIGHT: 174.98 LBS | OXYGEN SATURATION: 94 % | DIASTOLIC BLOOD PRESSURE: 64 MMHG | HEART RATE: 72 BPM | HEIGHT: 71 IN | RESPIRATION RATE: 18 BRPM | TEMPERATURE: 97.6 F | BODY MASS INDEX: 24.5 KG/M2 | SYSTOLIC BLOOD PRESSURE: 91 MMHG

## 2023-03-31 PROBLEM — J96.21 ACUTE ON CHRONIC RESPIRATORY FAILURE WITH HYPOXIA (HCC): Status: RESOLVED | Noted: 2023-03-28 | Resolved: 2023-03-31

## 2023-03-31 PROBLEM — A41.9 SEPSIS (HCC): Status: RESOLVED | Noted: 2023-03-28 | Resolved: 2023-03-31

## 2023-03-31 PROBLEM — E83.39 HYPOPHOSPHATEMIA: Status: RESOLVED | Noted: 2023-03-30 | Resolved: 2023-03-31

## 2023-03-31 PROBLEM — J18.9 PNEUMONIA: Status: RESOLVED | Noted: 2023-03-29 | Resolved: 2023-03-31

## 2023-03-31 PROBLEM — E87.5 HYPERKALEMIA: Status: RESOLVED | Noted: 2023-03-28 | Resolved: 2023-03-31

## 2023-03-31 LAB
ALBUMIN SERPL BCP-MCNC: 2.6 G/DL (ref 3.2–4.9)
ALBUMIN/GLOB SERPL: 0.9 G/DL
ALP SERPL-CCNC: 55 U/L (ref 30–99)
ALT SERPL-CCNC: 41 U/L (ref 2–50)
ANION GAP SERPL CALC-SCNC: 10 MMOL/L (ref 7–16)
AST SERPL-CCNC: 32 U/L (ref 12–45)
BASOPHILS # BLD AUTO: 0.4 % (ref 0–1.8)
BASOPHILS # BLD: 0.03 K/UL (ref 0–0.12)
BILIRUB SERPL-MCNC: 0.3 MG/DL (ref 0.1–1.5)
BUN SERPL-MCNC: 9 MG/DL (ref 8–22)
CALCIUM ALBUM COR SERPL-MCNC: 9.1 MG/DL (ref 8.5–10.5)
CALCIUM SERPL-MCNC: 8 MG/DL (ref 8.5–10.5)
CHLORIDE SERPL-SCNC: 102 MMOL/L (ref 96–112)
CO2 SERPL-SCNC: 26 MMOL/L (ref 20–33)
CREAT SERPL-MCNC: 0.39 MG/DL (ref 0.5–1.4)
EOSINOPHIL # BLD AUTO: 0.29 K/UL (ref 0–0.51)
EOSINOPHIL NFR BLD: 3.4 % (ref 0–6.9)
ERYTHROCYTE [DISTWIDTH] IN BLOOD BY AUTOMATED COUNT: 43.2 FL (ref 35.9–50)
GFR SERPLBLD CREATININE-BSD FMLA CKD-EPI: 114 ML/MIN/1.73 M 2
GLOBULIN SER CALC-MCNC: 2.8 G/DL (ref 1.9–3.5)
GLUCOSE SERPL-MCNC: 97 MG/DL (ref 65–99)
HCT VFR BLD AUTO: 29.7 % (ref 42–52)
HGB BLD-MCNC: 9.5 G/DL (ref 14–18)
IMM GRANULOCYTES # BLD AUTO: 0.03 K/UL (ref 0–0.11)
IMM GRANULOCYTES NFR BLD AUTO: 0.4 % (ref 0–0.9)
LYMPHOCYTES # BLD AUTO: 1.28 K/UL (ref 1–4.8)
LYMPHOCYTES NFR BLD: 15.2 % (ref 22–41)
MAGNESIUM SERPL-MCNC: 1.7 MG/DL (ref 1.5–2.5)
MCH RBC QN AUTO: 27.4 PG (ref 27–33)
MCHC RBC AUTO-ENTMCNC: 32 G/DL (ref 33.7–35.3)
MCV RBC AUTO: 85.6 FL (ref 81.4–97.8)
MONOCYTES # BLD AUTO: 0.78 K/UL (ref 0–0.85)
MONOCYTES NFR BLD AUTO: 9.2 % (ref 0–13.4)
NEUTROPHILS # BLD AUTO: 6.03 K/UL (ref 1.82–7.42)
NEUTROPHILS NFR BLD: 71.4 % (ref 44–72)
NRBC # BLD AUTO: 0 K/UL
NRBC BLD-RTO: 0 /100 WBC
PHOSPHATE SERPL-MCNC: 2.7 MG/DL (ref 2.5–4.5)
PLATELET # BLD AUTO: 224 K/UL (ref 164–446)
PMV BLD AUTO: 10.7 FL (ref 9–12.9)
POTASSIUM SERPL-SCNC: 3.6 MMOL/L (ref 3.6–5.5)
PROT SERPL-MCNC: 5.4 G/DL (ref 6–8.2)
RBC # BLD AUTO: 3.47 M/UL (ref 4.7–6.1)
SODIUM SERPL-SCNC: 138 MMOL/L (ref 135–145)
WBC # BLD AUTO: 8.4 K/UL (ref 4.8–10.8)

## 2023-03-31 PROCEDURE — 83735 ASSAY OF MAGNESIUM: CPT

## 2023-03-31 PROCEDURE — 700105 HCHG RX REV CODE 258: Performed by: INTERNAL MEDICINE

## 2023-03-31 PROCEDURE — 94640 AIRWAY INHALATION TREATMENT: CPT

## 2023-03-31 PROCEDURE — 80053 COMPREHEN METABOLIC PANEL: CPT

## 2023-03-31 PROCEDURE — 36415 COLL VENOUS BLD VENIPUNCTURE: CPT

## 2023-03-31 PROCEDURE — 700111 HCHG RX REV CODE 636 W/ 250 OVERRIDE (IP)

## 2023-03-31 PROCEDURE — A9270 NON-COVERED ITEM OR SERVICE: HCPCS | Performed by: INTERNAL MEDICINE

## 2023-03-31 PROCEDURE — 700101 HCHG RX REV CODE 250

## 2023-03-31 PROCEDURE — 84100 ASSAY OF PHOSPHORUS: CPT

## 2023-03-31 PROCEDURE — 85025 COMPLETE CBC W/AUTO DIFF WBC: CPT

## 2023-03-31 PROCEDURE — 700102 HCHG RX REV CODE 250 W/ 637 OVERRIDE(OP): Performed by: INTERNAL MEDICINE

## 2023-03-31 PROCEDURE — 99238 HOSP IP/OBS DSCHRG MGMT 30/<: CPT | Mod: GC | Performed by: HOSPITALIST

## 2023-03-31 RX ORDER — IPRATROPIUM BROMIDE AND ALBUTEROL SULFATE 2.5; .5 MG/3ML; MG/3ML
3 SOLUTION RESPIRATORY (INHALATION) ONCE
Status: COMPLETED | OUTPATIENT
Start: 2023-03-31 | End: 2023-03-31

## 2023-03-31 RX ORDER — AMOXICILLIN AND CLAVULANATE POTASSIUM 875; 125 MG/1; MG/1
1 TABLET, FILM COATED ORAL 2 TIMES DAILY
Qty: 10 TABLET | Refills: 0 | Status: ON HOLD | OUTPATIENT
Start: 2023-03-31 | End: 2023-04-06

## 2023-03-31 RX ADMIN — SODIUM CHLORIDE: 9 INJECTION, SOLUTION INTRAVENOUS at 07:38

## 2023-03-31 RX ADMIN — IPRATROPIUM BROMIDE AND ALBUTEROL SULFATE 3 ML: 2.5; .5 SOLUTION RESPIRATORY (INHALATION) at 11:20

## 2023-03-31 RX ADMIN — TAMSULOSIN HYDROCHLORIDE 0.4 MG: 0.4 CAPSULE ORAL at 04:01

## 2023-03-31 RX ADMIN — SENNOSIDES AND DOCUSATE SODIUM 2 TABLET: 50; 8.6 TABLET ORAL at 04:01

## 2023-03-31 RX ADMIN — CEFTRIAXONE SODIUM 2000 MG: 10 INJECTION, POWDER, FOR SOLUTION INTRAVENOUS at 04:01

## 2023-03-31 ASSESSMENT — FIBROSIS 4 INDEX: FIB4 SCORE: 1.7

## 2023-03-31 ASSESSMENT — PAIN DESCRIPTION - PAIN TYPE: TYPE: ACUTE PAIN

## 2023-03-31 ASSESSMENT — PATIENT HEALTH QUESTIONNAIRE - PHQ9
1. LITTLE INTEREST OR PLEASURE IN DOING THINGS: NOT AT ALL
SUM OF ALL RESPONSES TO PHQ9 QUESTIONS 1 AND 2: 0
2. FEELING DOWN, DEPRESSED, IRRITABLE, OR HOPELESS: NOT AT ALL

## 2023-03-31 NOTE — CARE PLAN
The patient is Stable - Low risk of patient condition declining or worsening    Shift Goals  Clinical Goals: safety/ Monitor O2  Patient Goals: rest  Family Goals: DAISHA    Progress made toward(s) clinical / shift goals:  O2 has been titrated down, pt remains safe without concerns    Patient is not progressing towards the following goals:      Problem: Knowledge Deficit - Standard  Goal: Patient and family/care givers will demonstrate understanding of plan of care, disease process/condition, diagnostic tests and medications  Outcome: Progressing   Pt has verbalized understanding of education given on the topics above

## 2023-03-31 NOTE — PROGRESS NOTES
Patient will be discharging home with home health. DC instructions were given, verbalized understanding. Home belongings were given waiting for transportation.

## 2023-03-31 NOTE — PROGRESS NOTES
Telemetry Report:    Rhythm:     SR  Heart Rate:    78-98  Ectopy:      -    AZ:         0.18  QRS:       0.08  QT:   0.35    Per [employer] from Monitor Room

## 2023-03-31 NOTE — DISCHARGE PLANNING
Case Management Discharge Planning    Admission Date: 3/28/2023  GMLOS: 5  ALOS: 3    6-Clicks ADL Score: 24  6-Clicks Mobility Score: 24      Anticipated Discharge Dispo: Discharge Disposition: D/T to hospice home (50)    DME Needed: No    Action(s) Taken: Updated Provider/Nurse on Discharge Plan, Transport Arranged , and OTHER    Escalations Completed: None    Medically Clear: Yes    Next Steps: None    Barriers to Discharge: None    CM completed chart review, attended IDT rounds, and coordinated care with Rachna from [facility] in person.  [facility] to [place] home and [facility]  [facility] will do their re-admission on Sunday (he is on service with them).  Choice obtained for hospice and referral sent via [employer] ([facility] has accepted).  Transportation arranged via [facility] for a 1630  time.  Approved Services form completed and faxed to [facility].  COBRA form given to primary RN.  Charge RN and Primary RN updated.  No further CM needs at this time.

## 2023-03-31 NOTE — DISCHARGE SUMMARY
[facility] Discharge Summary    Attending: Dr. Meehan  Senior Resident: Dr. Law  Intern:  Dr. Cm  Contact Number: [phone]    CHIEF COMPLAINT ON ADMISSION  Chief Complaint   Patient presents with    ALOC     Pt's caregiver called for a wellness check after not being able to get ahold of patient by telephone. LKW 0030 this morning. Pt was 75% on RA upon EMS arrival. Pt is supposed to wear oxygen at all times at home. Pt was not wearing his oxygen and was lethargic. Pt is on hospice for prostate cancer, pt states he is not receiving chemotherapy. Pt presents with POLST. Pt's only complaint is right arm pain. Pt denies falls.        Reason for Admission  EMS     Admission Date  3/28/2023    CODE STATUS  Prior    HPI & HOSPITAL COURSE  This is a 78 y.o. male with past medical history of chronic respiratory failure due to ILD (on 2 L of oxygen at baseline), T2DM, BPH, untreated prostate cancer, and on hospice care who was admitted on 3/28/2023 for acute on chronic respiratory failure and sepsis (tachycardia, leukocytosis) secondary to bacterial pneumonia (viral panel negative). Patient lives at home on hospice but his goals of care include therapies for reversible conditions, therefore he was admitted.  Patient was started on IV antibiotics and provided symptomatic relief.  Of note, CT chest was obtained showing honeycombing fibrosis, consistent with patient's known idiopathic ILD. While inpatient, patient remained afebrile, his leukocytosis trended down, and he returned to his baseline oxygen requirement of 2 L.  Patient was discharged with oral Augmentin to complete a 7-day total course of antibiotics.  Otherwise, no changes were made to his prior home medications.    Therefore, he is discharged in guarded but stable condition to return to hospice on his baseline o2 of 2L.  The patient met 2-midnight criteria for an inpatient stay at the time of discharge.    Discharge  Date  3/31/23    Physical Exam on Day of Discharge  Physical Exam  Constitutional:       General: He is not in acute distress.  HENT:      Head: Normocephalic and atraumatic.      Nose: Nose normal.      Mouth/Throat:      Mouth: Mucous membranes are moist.      Pharynx: Oropharynx is clear.   Eyes:      Extraocular Movements: Extraocular movements intact.   Cardiovascular:      Rate and Rhythm: Normal rate and regular rhythm.      Pulses: Normal pulses.   Pulmonary:      Effort: Pulmonary effort is normal. No respiratory distress.      Comments: BL fine crackles up 1/3 of lung volume  Wheezing of R upper lung  Abdominal:      General: Abdomen is flat. Bowel sounds are normal.   Genitourinary:     Comments: Bowman in place  Musculoskeletal:         General: Normal range of motion.      Cervical back: Normal range of motion.      Right lower leg: No edema.      Left lower leg: No edema.   Skin:     General: Skin is warm and dry.   Neurological:      General: No focal deficit present.      Mental Status: He is alert and oriented to person, place, and time.   Psychiatric:         Mood and Affect: Mood normal.         Behavior: Behavior normal.       FOLLOW UP ITEMS POST DISCHARGE  - PCP (as needed)  - home hospice    DISCHARGE DIAGNOSES  Principal Problem (Resolved):    Acute on chronic respiratory failure with hypoxia (HCC) POA: Yes  Active Problems:    Type 2 diabetes mellitus with hyperglycemia, with long-term current use of insulin (HCC) POA: Yes    Patient on full hospice care POA: Yes    Interstitial lung disease (HCC) POA: Yes    Prostate cancer (HCC) POA: Yes    BPH (benign prostatic hyperplasia) POA: Yes  Resolved Problems:    Sepsis (HCC) POA: Yes    Pneumonia POA: Yes    Hyperkalemia POA: Yes    Hypophosphatemia POA: Yes      FOLLOW UP  No future appointments.  Morenita Min M.D.  [address] [place] [id number] [phone]            MEDICATIONS ON DISCHARGE     Medication List        START taking  these medications        Instructions   amoxicillin-clavulanate 875-125 MG Tabs  Commonly known as: AUGMENTIN   Take 1 Tablet by mouth 2 times a day for 5 days.  Dose: 1 Tablet            CONTINUE taking these medications        Instructions   albuterol 2.5mg/0.5ml Nebu  Commonly known as: PROVENTIL   Take 2.5 mg by nebulization every 6 hours as needed for Shortness of Breath.  Dose: 2.5 mg     docusate calcium 240 MG Caps  Commonly known as: SURFAK   Take 240 mg by mouth 1 time a day as needed for Constipation.  Dose: 240 mg     insulin glargine 100 UNIT/ML Soln  Commonly known as: Lantus   Inject 20 Units under the skin every evening.  Dose: 20 Units     metFORMIN 500 MG Tabs  Commonly known as: GLUCOPHAGE   Take 1,000 mg by mouth 2 times a day with meals.  Dose: 1,000 mg     tamsulosin 0.4 MG capsule  Commonly known as: FLOMAX   Take 0.4 mg by mouth every day.  Dose: 0.4 mg              Allergies  No Known Allergies    DIET  As tolerated.    ACTIVITY  As tolerated.  Weight bearing as tolerated    CONSULTATIONS (n/a)  PROCEDURES (n/a)    LABORATORY  Lab Results   Component Value Date    SODIUM 138 03/31/2023    POTASSIUM 3.6 03/31/2023    CHLORIDE 102 03/31/2023    CO2 26 03/31/2023    GLUCOSE 97 03/31/2023    BUN 9 03/31/2023    CREATININE 0.39 (L) 03/31/2023        Lab Results   Component Value Date    WBC 8.4 03/31/2023    HEMOGLOBIN 9.5 (L) 03/31/2023    HEMATOCRIT 29.7 (L) 03/31/2023    PLATELETCT 224 03/31/2023        Total time of the discharge process 32 minutes.

## 2023-03-31 NOTE — DISCHARGE PLANNING
DC Transport Scheduled    Received request at: 3/31/2023 AT 1630    Transport Company Scheduled:  [employer]    Scheduled Date: 3/31/2023  Scheduled Time: 1630    Destination: HOME  [facility] [address] [place]    Notified care team of scheduled transport via [employer].     If there are any changes needed to the DC transportation scheduled, please contact [facility] at ext. [phone] between the hours of 7094-0132 Mon-Fri. If outside those hours, contact the ED Case Manager at ext. [phone].

## 2023-03-31 NOTE — CARE PLAN
The patient is Stable - Low risk of patient condition declining or worsening    Shift Goals  Clinical Goals: no SOB , no falls within shift  Patient Goals: adequate rest  Family Goals: DAISHA    Progress made toward(s) clinical / shift goals:  DC to home with hospice     Patient is not progressing towards the following goals:

## 2023-04-02 LAB
BACTERIA BLD CULT: NORMAL
BACTERIA BLD CULT: NORMAL
SIGNIFICANT IND 70042: NORMAL
SIGNIFICANT IND 70042: NORMAL
SITE SITE: NORMAL
SITE SITE: NORMAL
SOURCE SOURCE: NORMAL
SOURCE SOURCE: NORMAL

## 2023-04-04 ENCOUNTER — HOSPITAL ENCOUNTER (INPATIENT)
Facility: MEDICAL CENTER | Age: 78
LOS: 2 days | DRG: 193 | End: 2023-04-06
Attending: EMERGENCY MEDICINE | Admitting: STUDENT IN AN ORGANIZED HEALTH CARE EDUCATION/TRAINING PROGRAM
Payer: COMMERCIAL

## 2023-04-04 ENCOUNTER — APPOINTMENT (OUTPATIENT)
Dept: RADIOLOGY | Facility: MEDICAL CENTER | Age: 78
DRG: 193 | End: 2023-04-04
Attending: EMERGENCY MEDICINE
Payer: COMMERCIAL

## 2023-04-04 DIAGNOSIS — J18.9 PNEUMONIA DUE TO INFECTIOUS ORGANISM, UNSPECIFIED LATERALITY, UNSPECIFIED PART OF LUNG: ICD-10-CM

## 2023-04-04 DIAGNOSIS — C61 PROSTATE CANCER (HCC): ICD-10-CM

## 2023-04-04 DIAGNOSIS — R53.1 WEAKNESS: ICD-10-CM

## 2023-04-04 PROBLEM — J96.11 CHRONIC HYPOXEMIC RESPIRATORY FAILURE (HCC): Status: ACTIVE | Noted: 2023-04-04

## 2023-04-04 PROBLEM — J84.112 IDIOPATHIC PULMONARY FIBROSIS (HCC): Status: ACTIVE | Noted: 2023-03-30

## 2023-04-04 LAB
ALBUMIN SERPL BCP-MCNC: 3.3 G/DL (ref 3.2–4.9)
ALBUMIN/GLOB SERPL: 0.9 G/DL
ALP SERPL-CCNC: 59 U/L (ref 30–99)
ALT SERPL-CCNC: 24 U/L (ref 2–50)
ANION GAP SERPL CALC-SCNC: 10 MMOL/L (ref 7–16)
AST SERPL-CCNC: 26 U/L (ref 12–45)
BASOPHILS # BLD AUTO: 0.7 % (ref 0–1.8)
BASOPHILS # BLD: 0.04 K/UL (ref 0–0.12)
BILIRUB SERPL-MCNC: 0.3 MG/DL (ref 0.1–1.5)
BUN SERPL-MCNC: 11 MG/DL (ref 8–22)
CALCIUM ALBUM COR SERPL-MCNC: 9.4 MG/DL (ref 8.5–10.5)
CALCIUM SERPL-MCNC: 8.8 MG/DL (ref 8.5–10.5)
CHLORIDE SERPL-SCNC: 96 MMOL/L (ref 96–112)
CO2 SERPL-SCNC: 29 MMOL/L (ref 20–33)
CREAT SERPL-MCNC: 0.63 MG/DL (ref 0.5–1.4)
EOSINOPHIL # BLD AUTO: 0.04 K/UL (ref 0–0.51)
EOSINOPHIL NFR BLD: 0.7 % (ref 0–6.9)
ERYTHROCYTE [DISTWIDTH] IN BLOOD BY AUTOMATED COUNT: 42.7 FL (ref 35.9–50)
GFR SERPLBLD CREATININE-BSD FMLA CKD-EPI: 99 ML/MIN/1.73 M 2
GLOBULIN SER CALC-MCNC: 3.8 G/DL (ref 1.9–3.5)
GLUCOSE BLD STRIP.AUTO-MCNC: 105 MG/DL (ref 65–99)
GLUCOSE SERPL-MCNC: 116 MG/DL (ref 65–99)
HCT VFR BLD AUTO: 35.5 % (ref 42–52)
HGB BLD-MCNC: 11.6 G/DL (ref 14–18)
IMM GRANULOCYTES # BLD AUTO: 0.02 K/UL (ref 0–0.11)
IMM GRANULOCYTES NFR BLD AUTO: 0.4 % (ref 0–0.9)
LACTATE SERPL-SCNC: 1.5 MMOL/L (ref 0.5–2)
LYMPHOCYTES # BLD AUTO: 0.97 K/UL (ref 1–4.8)
LYMPHOCYTES NFR BLD: 17.1 % (ref 22–41)
MAGNESIUM SERPL-MCNC: 1.8 MG/DL (ref 1.5–2.5)
MCH RBC QN AUTO: 27.2 PG (ref 27–33)
MCHC RBC AUTO-ENTMCNC: 32.7 G/DL (ref 33.7–35.3)
MCV RBC AUTO: 83.1 FL (ref 81.4–97.8)
MONOCYTES # BLD AUTO: 0.97 K/UL (ref 0–0.85)
MONOCYTES NFR BLD AUTO: 17.1 % (ref 0–13.4)
NEUTROPHILS # BLD AUTO: 3.64 K/UL (ref 1.82–7.42)
NEUTROPHILS NFR BLD: 64 % (ref 44–72)
NRBC # BLD AUTO: 0 K/UL
NRBC BLD-RTO: 0 /100 WBC
NT-PROBNP SERPL IA-MCNC: 1327 PG/ML (ref 0–125)
PLATELET # BLD AUTO: 347 K/UL (ref 164–446)
PMV BLD AUTO: 9.5 FL (ref 9–12.9)
POTASSIUM SERPL-SCNC: 3.9 MMOL/L (ref 3.6–5.5)
PROCALCITONIN SERPL-MCNC: 0.09 NG/ML
PROT SERPL-MCNC: 7.1 G/DL (ref 6–8.2)
RBC # BLD AUTO: 4.27 M/UL (ref 4.7–6.1)
SODIUM SERPL-SCNC: 135 MMOL/L (ref 135–145)
TROPONIN T SERPL-MCNC: 28 NG/L (ref 6–19)
TROPONIN T SERPL-MCNC: 31 NG/L (ref 6–19)
WBC # BLD AUTO: 5.7 K/UL (ref 4.8–10.8)

## 2023-04-04 PROCEDURE — A9270 NON-COVERED ITEM OR SERVICE: HCPCS | Performed by: EMERGENCY MEDICINE

## 2023-04-04 PROCEDURE — 770001 HCHG ROOM/CARE - MED/SURG/GYN PRIV*

## 2023-04-04 PROCEDURE — 87040 BLOOD CULTURE FOR BACTERIA: CPT | Mod: 91

## 2023-04-04 PROCEDURE — 83735 ASSAY OF MAGNESIUM: CPT

## 2023-04-04 PROCEDURE — 700111 HCHG RX REV CODE 636 W/ 250 OVERRIDE (IP): Performed by: EMERGENCY MEDICINE

## 2023-04-04 PROCEDURE — 36415 COLL VENOUS BLD VENIPUNCTURE: CPT

## 2023-04-04 PROCEDURE — 83605 ASSAY OF LACTIC ACID: CPT

## 2023-04-04 PROCEDURE — 84484 ASSAY OF TROPONIN QUANT: CPT

## 2023-04-04 PROCEDURE — 99285 EMERGENCY DEPT VISIT HI MDM: CPT

## 2023-04-04 PROCEDURE — 84145 PROCALCITONIN (PCT): CPT

## 2023-04-04 PROCEDURE — 82962 GLUCOSE BLOOD TEST: CPT

## 2023-04-04 PROCEDURE — 700105 HCHG RX REV CODE 258: Performed by: EMERGENCY MEDICINE

## 2023-04-04 PROCEDURE — 99222 1ST HOSP IP/OBS MODERATE 55: CPT | Mod: GC | Performed by: STUDENT IN AN ORGANIZED HEALTH CARE EDUCATION/TRAINING PROGRAM

## 2023-04-04 PROCEDURE — 700102 HCHG RX REV CODE 250 W/ 637 OVERRIDE(OP): Performed by: EMERGENCY MEDICINE

## 2023-04-04 PROCEDURE — 85025 COMPLETE CBC W/AUTO DIFF WBC: CPT

## 2023-04-04 PROCEDURE — 96365 THER/PROPH/DIAG IV INF INIT: CPT

## 2023-04-04 PROCEDURE — 80053 COMPREHEN METABOLIC PANEL: CPT

## 2023-04-04 PROCEDURE — 71045 X-RAY EXAM CHEST 1 VIEW: CPT

## 2023-04-04 PROCEDURE — 83880 ASSAY OF NATRIURETIC PEPTIDE: CPT

## 2023-04-04 RX ORDER — TAMSULOSIN HYDROCHLORIDE 0.4 MG/1
0.4 CAPSULE ORAL DAILY
Status: DISCONTINUED | OUTPATIENT
Start: 2023-04-05 | End: 2023-04-06 | Stop reason: HOSPADM

## 2023-04-04 RX ORDER — BISACODYL 10 MG
10 SUPPOSITORY, RECTAL RECTAL
Status: DISCONTINUED | OUTPATIENT
Start: 2023-04-04 | End: 2023-04-06 | Stop reason: HOSPADM

## 2023-04-04 RX ORDER — POLYETHYLENE GLYCOL 3350 17 G/17G
1 POWDER, FOR SOLUTION ORAL
Status: DISCONTINUED | OUTPATIENT
Start: 2023-04-04 | End: 2023-04-06 | Stop reason: HOSPADM

## 2023-04-04 RX ORDER — ONDANSETRON 2 MG/ML
4 INJECTION INTRAMUSCULAR; INTRAVENOUS EVERY 4 HOURS PRN
Status: DISCONTINUED | OUTPATIENT
Start: 2023-04-04 | End: 2023-04-06 | Stop reason: HOSPADM

## 2023-04-04 RX ORDER — AMOXICILLIN 250 MG
2 CAPSULE ORAL 2 TIMES DAILY
Status: DISCONTINUED | OUTPATIENT
Start: 2023-04-04 | End: 2023-04-06 | Stop reason: HOSPADM

## 2023-04-04 RX ORDER — AZITHROMYCIN 250 MG/1
500 TABLET, FILM COATED ORAL ONCE
Status: COMPLETED | OUTPATIENT
Start: 2023-04-04 | End: 2023-04-04

## 2023-04-04 RX ORDER — AMOXICILLIN AND CLAVULANATE POTASSIUM 875; 125 MG/1; MG/1
1 TABLET, FILM COATED ORAL 2 TIMES DAILY
Status: COMPLETED | OUTPATIENT
Start: 2023-04-05 | End: 2023-04-05

## 2023-04-04 RX ORDER — ONDANSETRON 4 MG/1
4 TABLET, ORALLY DISINTEGRATING ORAL EVERY 4 HOURS PRN
Status: DISCONTINUED | OUTPATIENT
Start: 2023-04-04 | End: 2023-04-06 | Stop reason: HOSPADM

## 2023-04-04 RX ORDER — ACETAMINOPHEN 325 MG/1
650 TABLET ORAL EVERY 6 HOURS PRN
Status: DISCONTINUED | OUTPATIENT
Start: 2023-04-04 | End: 2023-04-06 | Stop reason: HOSPADM

## 2023-04-04 RX ORDER — ALBUTEROL SULFATE 2.5 MG/3ML
2.5 SOLUTION RESPIRATORY (INHALATION) EVERY 6 HOURS PRN
Status: DISCONTINUED | OUTPATIENT
Start: 2023-04-04 | End: 2023-04-06 | Stop reason: HOSPADM

## 2023-04-04 RX ORDER — GUAIFENESIN 200 MG/10ML
10 LIQUID ORAL EVERY 4 HOURS PRN
Status: DISCONTINUED | OUTPATIENT
Start: 2023-04-04 | End: 2023-04-06 | Stop reason: HOSPADM

## 2023-04-04 RX ORDER — LABETALOL HYDROCHLORIDE 5 MG/ML
10 INJECTION, SOLUTION INTRAVENOUS EVERY 4 HOURS PRN
Status: DISCONTINUED | OUTPATIENT
Start: 2023-04-04 | End: 2023-04-06 | Stop reason: HOSPADM

## 2023-04-04 RX ADMIN — AZITHROMYCIN MONOHYDRATE 500 MG: 250 TABLET ORAL at 18:58

## 2023-04-04 RX ADMIN — AMPICILLIN AND SULBACTAM 3 G: 1; 2 INJECTION, POWDER, FOR SOLUTION INTRAMUSCULAR; INTRAVENOUS at 18:55

## 2023-04-04 ASSESSMENT — COGNITIVE AND FUNCTIONAL STATUS - GENERAL
TURNING FROM BACK TO SIDE WHILE IN FLAT BAD: A LITTLE
HELP NEEDED FOR BATHING: A LITTLE
MOVING FROM LYING ON BACK TO SITTING ON SIDE OF FLAT BED: A LITTLE
WALKING IN HOSPITAL ROOM: A LOT
MOBILITY SCORE: 16
TOILETING: A LITTLE
SUGGESTED CMS G CODE MODIFIER DAILY ACTIVITY: CJ
SUGGESTED CMS G CODE MODIFIER MOBILITY: CK
CLIMB 3 TO 5 STEPS WITH RAILING: A LOT
DRESSING REGULAR LOWER BODY CLOTHING: A LITTLE
DAILY ACTIVITIY SCORE: 21
MOVING TO AND FROM BED TO CHAIR: A LITTLE
STANDING UP FROM CHAIR USING ARMS: A LITTLE

## 2023-04-04 ASSESSMENT — LIFESTYLE VARIABLES
TOTAL SCORE: 0
HAVE PEOPLE ANNOYED YOU BY CRITICIZING YOUR DRINKING: NO
ON A TYPICAL DAY WHEN YOU DRINK ALCOHOL HOW MANY DRINKS DO YOU HAVE: 0
AVERAGE NUMBER OF DAYS PER WEEK YOU HAVE A DRINK CONTAINING ALCOHOL: 0
ALCOHOL_USE: NO
DOES PATIENT WANT TO STOP DRINKING: NO
TOTAL SCORE: 0
EVER FELT BAD OR GUILTY ABOUT YOUR DRINKING: NO
HOW MANY TIMES IN THE PAST YEAR HAVE YOU HAD 5 OR MORE DRINKS IN A DAY: 0
EVER HAD A DRINK FIRST THING IN THE MORNING TO STEADY YOUR NERVES TO GET RID OF A HANGOVER: NO
TOTAL SCORE: 0
CONSUMPTION TOTAL: NEGATIVE
HAVE YOU EVER FELT YOU SHOULD CUT DOWN ON YOUR DRINKING: NO

## 2023-04-04 ASSESSMENT — ENCOUNTER SYMPTOMS
SPUTUM PRODUCTION: 1
WEAKNESS: 1
COUGH: 1
SHORTNESS OF BREATH: 1

## 2023-04-04 ASSESSMENT — FIBROSIS 4 INDEX: FIB4 SCORE: 1.7

## 2023-04-04 ASSESSMENT — PAIN DESCRIPTION - PAIN TYPE: TYPE: ACUTE PAIN

## 2023-04-05 LAB
ALBUMIN SERPL BCP-MCNC: 3 G/DL (ref 3.2–4.9)
ALBUMIN/GLOB SERPL: 0.8 G/DL
ALP SERPL-CCNC: 52 U/L (ref 30–99)
ALT SERPL-CCNC: 23 U/L (ref 2–50)
ANION GAP SERPL CALC-SCNC: 12 MMOL/L (ref 7–16)
AST SERPL-CCNC: 27 U/L (ref 12–45)
BASOPHILS # BLD AUTO: 0.9 % (ref 0–1.8)
BASOPHILS # BLD: 0.05 K/UL (ref 0–0.12)
BILIRUB SERPL-MCNC: 0.3 MG/DL (ref 0.1–1.5)
BUN SERPL-MCNC: 12 MG/DL (ref 8–22)
CALCIUM ALBUM COR SERPL-MCNC: 9.3 MG/DL (ref 8.5–10.5)
CALCIUM SERPL-MCNC: 8.5 MG/DL (ref 8.5–10.5)
CHLORIDE SERPL-SCNC: 97 MMOL/L (ref 96–112)
CO2 SERPL-SCNC: 26 MMOL/L (ref 20–33)
CREAT SERPL-MCNC: 0.42 MG/DL (ref 0.5–1.4)
EOSINOPHIL # BLD AUTO: 0.08 K/UL (ref 0–0.51)
EOSINOPHIL NFR BLD: 1.4 % (ref 0–6.9)
ERYTHROCYTE [DISTWIDTH] IN BLOOD BY AUTOMATED COUNT: 42.6 FL (ref 35.9–50)
GFR SERPLBLD CREATININE-BSD FMLA CKD-EPI: 111 ML/MIN/1.73 M 2
GLOBULIN SER CALC-MCNC: 3.6 G/DL (ref 1.9–3.5)
GLUCOSE BLD STRIP.AUTO-MCNC: 125 MG/DL (ref 65–99)
GLUCOSE BLD STRIP.AUTO-MCNC: 145 MG/DL (ref 65–99)
GLUCOSE BLD STRIP.AUTO-MCNC: 152 MG/DL (ref 65–99)
GLUCOSE BLD STRIP.AUTO-MCNC: 94 MG/DL (ref 65–99)
GLUCOSE SERPL-MCNC: 108 MG/DL (ref 65–99)
HCT VFR BLD AUTO: 35.8 % (ref 42–52)
HGB BLD-MCNC: 11.7 G/DL (ref 14–18)
IMM GRANULOCYTES # BLD AUTO: 0.02 K/UL (ref 0–0.11)
IMM GRANULOCYTES NFR BLD AUTO: 0.4 % (ref 0–0.9)
LYMPHOCYTES # BLD AUTO: 1.43 K/UL (ref 1–4.8)
LYMPHOCYTES NFR BLD: 25.2 % (ref 22–41)
MCH RBC QN AUTO: 27.2 PG (ref 27–33)
MCHC RBC AUTO-ENTMCNC: 32.7 G/DL (ref 33.7–35.3)
MCV RBC AUTO: 83.3 FL (ref 81.4–97.8)
MONOCYTES # BLD AUTO: 0.97 K/UL (ref 0–0.85)
MONOCYTES NFR BLD AUTO: 17.1 % (ref 0–13.4)
NEUTROPHILS # BLD AUTO: 3.12 K/UL (ref 1.82–7.42)
NEUTROPHILS NFR BLD: 55 % (ref 44–72)
NRBC # BLD AUTO: 0 K/UL
NRBC BLD-RTO: 0 /100 WBC
PLATELET # BLD AUTO: 328 K/UL (ref 164–446)
PMV BLD AUTO: 9.5 FL (ref 9–12.9)
POTASSIUM SERPL-SCNC: 3.8 MMOL/L (ref 3.6–5.5)
PROT SERPL-MCNC: 6.6 G/DL (ref 6–8.2)
RBC # BLD AUTO: 4.3 M/UL (ref 4.7–6.1)
SODIUM SERPL-SCNC: 135 MMOL/L (ref 135–145)
WBC # BLD AUTO: 5.7 K/UL (ref 4.8–10.8)

## 2023-04-05 PROCEDURE — 700102 HCHG RX REV CODE 250 W/ 637 OVERRIDE(OP): Performed by: STUDENT IN AN ORGANIZED HEALTH CARE EDUCATION/TRAINING PROGRAM

## 2023-04-05 PROCEDURE — 97166 OT EVAL MOD COMPLEX 45 MIN: CPT

## 2023-04-05 PROCEDURE — 85025 COMPLETE CBC W/AUTO DIFF WBC: CPT

## 2023-04-05 PROCEDURE — 82962 GLUCOSE BLOOD TEST: CPT | Mod: 91

## 2023-04-05 PROCEDURE — 770001 HCHG ROOM/CARE - MED/SURG/GYN PRIV*

## 2023-04-05 PROCEDURE — 36415 COLL VENOUS BLD VENIPUNCTURE: CPT

## 2023-04-05 PROCEDURE — 97162 PT EVAL MOD COMPLEX 30 MIN: CPT

## 2023-04-05 PROCEDURE — A9270 NON-COVERED ITEM OR SERVICE: HCPCS | Performed by: STUDENT IN AN ORGANIZED HEALTH CARE EDUCATION/TRAINING PROGRAM

## 2023-04-05 PROCEDURE — 80053 COMPREHEN METABOLIC PANEL: CPT

## 2023-04-05 PROCEDURE — 99232 SBSQ HOSP IP/OBS MODERATE 35: CPT | Performed by: INTERNAL MEDICINE

## 2023-04-05 PROCEDURE — 94760 N-INVAS EAR/PLS OXIMETRY 1: CPT

## 2023-04-05 RX ADMIN — SENNOSIDES AND DOCUSATE SODIUM 2 TABLET: 50; 8.6 TABLET ORAL at 17:05

## 2023-04-05 RX ADMIN — AMOXICILLIN AND CLAVULANATE POTASSIUM 1 TABLET: 875; 125 TABLET, FILM COATED ORAL at 06:04

## 2023-04-05 RX ADMIN — AMOXICILLIN AND CLAVULANATE POTASSIUM 1 TABLET: 875; 125 TABLET, FILM COATED ORAL at 17:05

## 2023-04-05 RX ADMIN — TAMSULOSIN HYDROCHLORIDE 0.4 MG: 0.4 CAPSULE ORAL at 06:04

## 2023-04-05 ASSESSMENT — COGNITIVE AND FUNCTIONAL STATUS - GENERAL
MOVING FROM LYING ON BACK TO SITTING ON SIDE OF FLAT BED: A LITTLE
TURNING FROM BACK TO SIDE WHILE IN FLAT BAD: A LITTLE
CLIMB 3 TO 5 STEPS WITH RAILING: TOTAL
HELP NEEDED FOR BATHING: A LITTLE
SUGGESTED CMS G CODE MODIFIER DAILY ACTIVITY: CK
MOVING TO AND FROM BED TO CHAIR: A LITTLE
SUGGESTED CMS G CODE MODIFIER MOBILITY: CK
DRESSING REGULAR UPPER BODY CLOTHING: A LITTLE
PERSONAL GROOMING: A LITTLE
MOBILITY SCORE: 17
DAILY ACTIVITIY SCORE: 18
DRESSING REGULAR LOWER BODY CLOTHING: A LOT
STANDING UP FROM CHAIR USING ARMS: A LITTLE
TOILETING: A LITTLE

## 2023-04-05 ASSESSMENT — ENCOUNTER SYMPTOMS
ABDOMINAL PAIN: 0
COUGH: 1
HEADACHES: 0
WEAKNESS: 1
SHORTNESS OF BREATH: 1

## 2023-04-05 ASSESSMENT — PAIN DESCRIPTION - PAIN TYPE: TYPE: ACUTE PAIN

## 2023-04-05 ASSESSMENT — GAIT ASSESSMENTS
ASSISTIVE DEVICE: 4 WHEEL WALKER
DEVIATION: DECREASED BASE OF SUPPORT
DISTANCE (FEET): 15
GAIT LEVEL OF ASSIST: STANDBY ASSIST

## 2023-04-05 ASSESSMENT — ACTIVITIES OF DAILY LIVING (ADL): TOILETING: INDEPENDENT

## 2023-04-05 NOTE — CARE PLAN
The patient is Stable - Low risk of patient condition declining or worsening    Shift Goals  Clinical Goals: Safety, regain strength, rest  Patient Goals: Rest, d/c home    Progress made toward(s) clinical / shift goals: Pt bed alarm in place, treaded socks on, provided safety education.     Problem: Knowledge Deficit - Standard  Goal: Patient and family/care givers will demonstrate understanding of plan of care, disease process/condition, diagnostic tests and medications  Outcome: Progressing     Problem: Fall Risk  Goal: Patient will remain free from falls  Outcome: Progressing

## 2023-04-05 NOTE — H&P
[facility] History & Physical Note    Date of Service  4/4/2023    [facility] Team: Night Team   Attending: Ezequiel Thomas M.d.  Senior Resident: Dr. Harden  Intern:    Contact Number: [phone]    Primary Care Physician  Morenita Min M.D.    Consultants      Code Status  DNAR/DNI    Chief Complaint  Chief Complaint   Patient presents with    Weakness     Pt comes in for increasing weakness since Saturday. Pt was discharged on saturday after being treated for pneumonia        History of Presenting Illness (HPI):   Blair Bah is a 78 y.o. male who presented 4/4/2023 with weakness.  Per notes, he was found by home health slumped over and unable to get up and walk to the restroom.  Past medical history includes recent hospitalization for community-acquired pneumonia, IPF on pirfenidone, COPD, type 2 diabetes, BPH, untreated prostate cancer for which he is on hospice care.  Upon arrival to the ED, tachypneic, SPO2 99% on 2 L nasal cannula.  He was afebrile with WBC lower than when he was discharged.  Chest x-ray revealed worsening interstitial infiltrates with possible small bilateral pleural effusions.  Started on Unasyn and azithromycin by the ED.    On my exam, the patient states he has been increasingly weak since his recent hospital discharge.  He states he is now unable to get himself out of bed.  He does continue to have cough productive of yellow sputum but denies worsening.  He denies any fever, chills.  He says he has been compliant with the Augmentin he was discharged with.  He does have baseline dyspnea from his IPF.  He uses 2 L oxygen around-the-clock at home.  He has his girlfriend Christianne help in that home.  He is only interested in treatment for reversible conditions.  He again expressed his wishes for DNR/DNI.    Discharge summary from 3/31/2023 reviewed.  Admitted for acute on chronic hypoxemic respiratory failure attributed to bacterial pneumonia.  Treated with ceftriaxone and  azithromycin.  Discharged back to hospice on baseline 2 L oxygen and on Augmentin to finish a 7-day course.      Review of Systems  Review of Systems   Constitutional:  Positive for malaise/fatigue.   Respiratory:  Positive for cough, sputum production and shortness of breath.    Neurological:  Positive for weakness.   All other systems reviewed and are negative.    Past Medical History   has a past medical history of Cancer (HCC) and COPD (chronic obstructive pulmonary disease) ([facility]).    Surgical History   has no past surgical history on file.     Family History  family history is not on file.   Family history reviewed with patient.     Social History  Tobacco: Denies  Alcohol: Denies  Recreational drugs (illegal or prescription): Denies  Employment:   Living Situation: Girlfriend Christianne helps him out at home  Recent Travel:   Primary Care Provider: Reviewed  Other (stressors, spirituality, exposures):     Allergies  No Known Allergies    Medications  Prior to Admission Medications   Prescriptions Last Dose Informant Patient Reported? Taking?   albuterol (PROVENTIL) 2.5mg/0.5ml Nebu Soln  Patient's Home Pharmacy Yes No   Sig: Take 2.5 mg by nebulization every 6 hours as needed for Shortness of Breath.   amoxicillin-clavulanate (AUGMENTIN) 875-125 MG Tab   No No   Sig: Take 1 Tablet by mouth 2 times a day for 5 days.   docusate calcium (SURFAK) 240 MG Cap  Patient's Home Pharmacy Yes No   Sig: Take 240 mg by mouth 1 time a day as needed for Constipation.   insulin glargine 100 UNIT/ML SC SOLN  Patient's Home Pharmacy Yes No   Sig: Inject 20 Units under the skin every evening.   metFORMIN (GLUCOPHAGE) 500 MG Tab  Patient's Home Pharmacy Yes No   Sig: Take 1,000 mg by mouth 2 times a day with meals.   tamsulosin (FLOMAX) 0.4 MG capsule  Patient's Home Pharmacy Yes No   Sig: Take 0.4 mg by mouth every day.      Facility-Administered Medications: None       Physical Exam  Temp:  [36.9 °C (98.5 °F)] 36.9 °C (98.5  °F)  Pulse:  [89-97] 89  Resp:  [16-25] 20  BP: (122-131)/(66-74) 123/66  SpO2:  [98 %-100 %] 98 %  Blood Pressure : 123/66   Temperature: 36.9 °C (98.5 °F)   Pulse: 89   Respiration: 20   Pulse Oximetry: 98 %       Physical Exam  Constitutional:       General: He is not in acute distress.     Comments: Frail   HENT:      Head: Normocephalic and atraumatic.      Right Ear: External ear normal.      Left Ear: External ear normal.      Mouth/Throat:      Mouth: Mucous membranes are moist.      Pharynx: Oropharynx is clear.   Eyes:      General: No scleral icterus.     Conjunctiva/sclera: Conjunctivae normal.   Cardiovascular:      Rate and Rhythm: Normal rate and regular rhythm.      Heart sounds: Murmur heard.   Pulmonary:      Breath sounds: No stridor. Rales present. No wheezing or rhonchi.      Comments: Coarse breath sounds left greater than right  Abdominal:      Palpations: Abdomen is soft.      Tenderness: There is no abdominal tenderness.   Musculoskeletal:      Cervical back: Normal range of motion and neck supple.      Right lower leg: No edema.      Left lower leg: No edema.   Skin:     General: Skin is warm and dry.      Capillary Refill: Capillary refill takes less than 2 seconds.   Neurological:      Mental Status: He is alert and oriented to person, place, and time.      Coordination: Coordination normal.   Psychiatric:         Behavior: Behavior normal.         Thought Content: Thought content normal.       Laboratory:  Recent Labs     04/04/23  1724   WBC 5.7   RBC 4.27*   HEMOGLOBIN 11.6*   HEMATOCRIT 35.5*   MCV 83.1   MCH 27.2   MCHC 32.7*   RDW 42.7   PLATELETCT 347   MPV 9.5     Recent Labs     04/04/23  1724   SODIUM 135   POTASSIUM 3.9   CHLORIDE 96   CO2 29   GLUCOSE 116*   BUN 11   CREATININE 0.63   CALCIUM 8.8     Recent Labs     04/04/23  1724   ALTSGPT 24   ASTSGOT 26   ALKPHOSPHAT 59   TBILIRUBIN 0.3   GLUCOSE 116*         No results for input(s): NTPROBNP in the last 72 hours.       Recent Labs     04/04/23  1724   TROPONINT 31*       Imaging:  DX-CHEST-PORTABLE (1 VIEW)   Final Result      1.  Worsening interstitial infiltrates and/or edema.   2.  Possible small bilateral pleural effusions.        Chest x-ray: I have personally reviewed the patient's chest x-ray.  Per my read, diffuse interstitial opacification of bilateral lungs worse at the bases.  Nonvisualized costophrenic angle on the right.      Assessment/Plan:  Problem Representation:   Blair Bah is a 78 y.o. male with a past medical history significant for recent hospitalization for community-acquired pneumonia, IPF on pirfenidone, COPD, type 2 diabetes, BPH, untreated prostate cancer for which he is on hospice care here with deconditioning.    * Weakness generalized- (present on admission)  Assessment & Plan  Likely deconditioning  PT OT consulted  Discussed with the patient that he will likely need home health PT versus rehab    Community acquired pneumonia  Assessment & Plan  Recently admitted for this 3/28 - 3/31, treated with ceftriaxone/azithromycin switched to Augmentin on discharge  This presentation, received Unasyn/azithromycin given by ED  No leukocytosis, no fever, not septic  Continue Augmentin to finish 7 day course as outlined in DC summary    Chronic hypoxemic respiratory failure (HCC)  Assessment & Plan  Due to IPF  At baseline home oxygen 2 L  IS    Idiopathic pulmonary fibrosis (HCC)- (present on admission)  Assessment & Plan  Follows with pulmonary at [facility], Dr. Sheldon Kuo  On pirfenidone    Patient on full hospice care- (present on admission)  Assessment & Plan  Per patient and last DC summary  Patient only wants treatment for reversible conditions      Type 2 diabetes mellitus with hyperglycemia, with long-term current use of insulin (HCC)- (present on admission)  Assessment & Plan  Hold home meds  Home insulin at reduced dose with SSI while inpatient  We will monitor blood glucose checks  and titrate insulin as necessary; goal sugars 140-180  Diabetic diet when able    Prostate cancer (HCC)- (present on admission)  Assessment & Plan  Patient on hospice care, not interested in pursuing treatment    BPH (benign prostatic hyperplasia)- (present on admission)  Assessment & Plan  Flomax        VTE prophylaxis: SCDs/TEDs

## 2023-04-05 NOTE — PROGRESS NOTES
Assumed care of pt, oriented to floor and unit, updated on POC. Pt is A&Ox 4, 2 L O2, VSS. No c/o pain at this time. Pt resting in bed w/ call light and belongings in reach, bed alarm on, hourly rounding in place. No additional needs at this time.

## 2023-04-05 NOTE — PROGRESS NOTES
4 Eyes Skin Assessment Completed by Rachna RN and Fili RN.    Head WDL  Ears WDL  Nose WDL  Mouth WDL  Neck WDL  Breast/Chest WDL  Shoulder Blades WDL  Spine WDL  (R) Arm/Elbow/Hand WDL  (L) Arm/Elbow/Hand WDL  Abdomen WDL  Groin WDL  Scrotum/Coccyx/Buttocks WDL  (R) Leg WDL  (L) Leg WDL  (R) Heel/Foot/Toe WDL  (L) Heel/Foot/Toe WDL          Devices In Places Condom Cath and Nasal Cannula      Interventions In Place Gray Ear Foams, Pillows, and Pressure Redistribution Mattress    Possible Skin Injury No    Pictures Uploaded Into Epic N/A  Wound Consult Placed N/A  RN Wound Prevention Protocol Ordered Yes

## 2023-04-05 NOTE — THERAPY
Physical Therapy   Daily Treatment     Patient Name: Blair Bah  Age:  78 y.o., Sex:  male  Medical Record #: [id number]  Today's Date: 4/5/2023     Precautions  Precautions: Fall Risk    Assessment    Pt remains fragile, but able to transfer OOB with min A (flat bed, no rail like at home, but easier at home as pt can grab edge of mattress to assist), transfer with cg assist from higher surfaces. Pt shows decreased endurance for ambulation, tolerating 15 feet total using FWW today with SBA using his 4WW. Pt reports spending most of his time in bed and receiving assist several times a day from his friend/volunteer caregiver. PT to continue to follow to address endurance issues, attempt longer ambulation.      Plan    Treatment Plan Status:    Type of Treatment: Bed Mobility, Gait Training, Neuro Re-Education / Balance, Therapeutic Activities  Treatment Frequency: 3 Times per Week  Treatment Duration: Until Therapy Goals Met    DC Equipment Recommendations: None  Discharge Recommendations: Recommend home health for continued physical therapy services           Objective       04/05/23 1120   Charge Group   PT Evaluation PT Evaluation Mod   Total Time Spent   PT Total Time Yes   PT Evaluation Time Spent (Mins) 23   PT Total Time Spent (Calculated) 23    Services   Is patient using  services for this encounter? No   Initial Contact Note    Initial Contact Note Order Received and Verified, Physical Therapy Evaluation in Progress with Full Report to Follow.   Precautions   Precautions Fall Risk   Vitals   O2 (LPM) 3   O2 Delivery Device Nasal Cannula   Pain 0 - 10 Group   Therapist Pain Assessment During Activity;Nurse Notified;0   Prior Living Situation   Prior Services Intermittent Physical Support for ADL Per Service   Housing / Facility 2 Story Apartment / Condo  (senior apts.)   Steps Into Home 0   Steps In Home 0   Elevator Yes   Equipment Owned 4-Wheel Walker;Oxygen   Lives with -  Patient's Self Care Capacity Alone and Able to Care For Self   Comments friend/caregiver Lisa visits several times a day and hospice once a week for med check.   Prior Level of Functional Mobility   Bed Mobility Independent   Transfer Status Independent   Ambulation Independent   Ambulation Distance short household   Assistive Devices Used 4-Wheel Walker   Comments Pt reports spending most of his time in bed, wears briefs at night.   Cognition    Level of Consciousness Alert   Active ROM Lower Body    Active ROM Lower Body  WDL   Strength Lower Body   Lower Body Strength  X   Gross Strength Generalized Weakness, Equal Bilaterally   Balance Assessment   Sitting Balance (Static) Fair   Sitting Balance (Dynamic) Fair   Standing Balance (Static) Fair -   Standing Balance (Dynamic) Fair -   Weight Shift Sitting Fair   Weight Shift Standing Fair   Comments with 4WW   Bed Mobility    Supine to Sit Minimal Assist  (with flat bed, no rail, pt reports easier at home as he can pull up on edge of mattress.)   Sit to Supine Supervised   Scooting Supervised   Rolling Supervised   Gait Analysis   Gait Level Of Assist Standby Assist   Assistive Device 4 Wheel Walker   Distance (Feet) 15   # of Times Distance was Traveled 1   Deviation Decreased Base Of Support  (flexed posture, baseline)   # of Stairs Climbed 0   Weight Bearing Status no restrictions   Functional Mobility   Sit to Stand Contact Guard Assist   Bed, Chair, Wheelchair Transfer Contact Guard Assist   Transfer Method Stand Pivot   How much difficulty does the patient currently have...   Turning over in bed (including adjusting bedclothes, sheets and blankets)? 3   Sitting down on and standing up from a chair with arms (e.g., wheelchair, bedside commode, etc.) 3   Moving from lying on back to sitting on the side of the bed? 3   How much help from another person does the patient currently need...   Moving to and from a bed to a chair (including a wheelchair)? 3   Need  to walk in a hospital room? 4   Climbing 3-5 steps with a railing? 1   6 clicks Mobility Score 17   Activity Tolerance   Sitting Edge of Bed 5   Standing 5   Short Term Goals    Short Term Goal # 1 Pt will transfer with mod indep in 6 visits to improve functional indep.   Short Term Goal # 2 Pt will ambulate x 75 feet using FWW with supervision in 6 visits to improve functional indep.   Education Group   Education Provided Role of Physical Therapist   Role of Physical Therapist Patient Response Patient;Acceptance;Explanation;Verbal Demonstration   Physical Therapy Initial Treatment Plan    Treatment Plan  Bed Mobility;Gait Training;Neuro Re-Education / Balance;Therapeutic Activities   Treatment Frequency 3 Times per Week   Duration Until Therapy Goals Met   Problem List    Problems Decreased Activity Tolerance;Impaired Balance;Functional Strength Deficit;Impaired Ambulation;Impaired Transfers   Anticipated Discharge Equipment and Recommendations   DC Equipment Recommendations None   Discharge Recommendations Recommend home health for continued physical therapy services   Interdisciplinary Plan of Care Collaboration   IDT Collaboration with  Nursing   Patient Position at End of Therapy In Bed;Call Light within Reach;Tray Table within Reach;Phone within Reach;Bed Alarm On   Collaboration Comments nsg updated   Session Information   Date / Session Number  4/5-1 (1/3, 4/11)

## 2023-04-05 NOTE — ED PROVIDER NOTES
"ED Provider Note    CHIEF COMPLAINT  Chief Complaint   Patient presents with    Weakness     Pt comes in for increasing weakness since Saturday. Pt was discharged on saturday after being treated for pneumonia        EXTERNAL RECORDS REVIEWED  None    HPI/ROS  LIMITATION TO HISTORY   None  OUTSIDE HISTORIAN(S):  None    Blair Bah is a 78 y.o. male who presents this is a 78-year-old male here for evaluation of dyspnea on exertion, and cough.  Patient states he has had a productive cough over the last few days, was recently released from the hospital on Saturday.  He states he was treated for pneumonia, but then went home.  He has no fever chills, no vomiting, but he does have an extreme amount of weakness, and     PAST MEDICAL HISTORY   has a past medical history of Cancer (HCC) and COPD (chronic obstructive pulmonary disease) (HCC).    SURGICAL HISTORY  patient denies any surgical history    FAMILY HISTORY  No family history on file.    SOCIAL HISTORY  Social History     Tobacco Use    Smoking status: Former     Types: Cigarettes    Smokeless tobacco: Never   Vaping Use    Vaping Use: Never used   Substance and Sexual Activity    Alcohol use: Not Currently    Drug use: Not Currently    Sexual activity: Not on file       CURRENT MEDICATIONS  Home Medications    **Home medications have not yet been reviewed for this encounter**         ALLERGIES  No Known Allergies    PHYSICAL EXAM  VITAL SIGNS: /66   Pulse 89   Temp 36.9 °C (98.5 °F) (Temporal)   Resp 20   Ht 1.803 m (5' 11\")   Wt 79.4 kg (175 lb 0.7 oz)   SpO2 98%   BMI 24.41 kg/m²    Constitutional: Well developed, well nourished. No acute distress.  HEENT: Normocephalic, atraumatic. Posterior pharynx clear and moist.  Eyes:  EOMI. Normal sclera.  Neck: Supple, Full range of motion, nontender.  Chest/Pulmonary:  diminished breath sounds, equal expansion   Cardio: Regular rate and rhythm with no murmur.   Abdomen: Soft, nontender. No peritoneal " signs. No guarding. No palpable masses.  Back: No CVA tenderness, nontender midline, no step offs.  Musculoskeletal: No deformity, no edema, neurovascular intact.   Neuro: Clear speech, appropriate, cooperative, cranial nerves II-XII grossly intact.  Psych: Normal mood and affect      DIAGNOSTIC STUDIES / PROCEDURES  Results for orders placed or performed during the hospital encounter of 04/04/23   CBC w/ Differential   Result Value Ref Range    WBC 5.7 4.8 - 10.8 K/uL    RBC 4.27 (L) 4.70 - 6.10 M/uL    Hemoglobin 11.6 (L) 14.0 - 18.0 g/dL    Hematocrit 35.5 (L) 42.0 - 52.0 %    MCV 83.1 81.4 - 97.8 fL    MCH 27.2 27.0 - 33.0 pg    MCHC 32.7 (L) 33.7 - 35.3 g/dL    RDW 42.7 35.9 - 50.0 fL    Platelet Count 347 164 - 446 K/uL    MPV 9.5 9.0 - 12.9 fL    Neutrophils-Polys 64.00 44.00 - 72.00 %    Lymphocytes 17.10 (L) 22.00 - 41.00 %    Monocytes 17.10 (H) 0.00 - 13.40 %    Eosinophils 0.70 0.00 - 6.90 %    Basophils 0.70 0.00 - 1.80 %    Immature Granulocytes 0.40 0.00 - 0.90 %    Nucleated RBC 0.00 /100 WBC    Neutrophils (Absolute) 3.64 1.82 - 7.42 K/uL    Lymphs (Absolute) 0.97 (L) 1.00 - 4.80 K/uL    Monos (Absolute) 0.97 (H) 0.00 - 0.85 K/uL    Eos (Absolute) 0.04 0.00 - 0.51 K/uL    Baso (Absolute) 0.04 0.00 - 0.12 K/uL    Immature Granulocytes (abs) 0.02 0.00 - 0.11 K/uL    NRBC (Absolute) 0.00 K/uL   Complete Metabolic Panel (CMP)   Result Value Ref Range    Sodium 135 135 - 145 mmol/L    Potassium 3.9 3.6 - 5.5 mmol/L    Chloride 96 96 - 112 mmol/L    Co2 29 20 - 33 mmol/L    Anion Gap 10.0 7.0 - 16.0    Glucose 116 (H) 65 - 99 mg/dL    Bun 11 8 - 22 mg/dL    Creatinine 0.63 0.50 - 1.40 mg/dL    Calcium 8.8 8.5 - 10.5 mg/dL    AST(SGOT) 26 12 - 45 U/L    ALT(SGPT) 24 2 - 50 U/L    Alkaline Phosphatase 59 30 - 99 U/L    Total Bilirubin 0.3 0.1 - 1.5 mg/dL    Albumin 3.3 3.2 - 4.9 g/dL    Total Protein 7.1 6.0 - 8.2 g/dL    Globulin 3.8 (H) 1.9 - 3.5 g/dL    A-G Ratio 0.9 g/dL   TROPONIN   Result Value Ref  Range    Troponin T 31 (H) 6 - 19 ng/L   CORRECTED CALCIUM   Result Value Ref Range    Correct Calcium 9.4 8.5 - 10.5 mg/dL   ESTIMATED GFR   Result Value Ref Range    GFR (CKD-EPI) 99 >60 mL/min/1.73 m 2           RADIOLOGY  I have independently interpreted the diagnostic imaging associated with this visit and am waiting the final reading from the radiologist.   My preliminary interpretation is as follows: see below  Radiologist interpretation:   DX-CHEST-PORTABLE (1 VIEW)   Final Result      1.  Worsening interstitial infiltrates and/or edema.   2.  Possible small bilateral pleural effusions.            COURSE & MEDICAL DECISION MAKING  The pt will be admitted to the hospitalist.   [facility] will re admit the pt.     INITIAL ASSESSMENT, COURSE AND PLAN    DISPOSITION AND DISCUSSIONS      FINAL DIAGNOSIS  1. Weakness    2. Pneumonia due to infectious organism, unspecified laterality, unspecified part of lung           Electronically signed by: Foster Jara D.O., 4/4/2023 6:03 PM

## 2023-04-05 NOTE — ASSESSMENT & PLAN NOTE
Recently admitted for this 3/28 - 3/31, treated with ceftriaxone/azithromycin switched to Augmentin on discharge  Redness of breath and cough improving  He will continue his course of Augmentin to completion

## 2023-04-05 NOTE — PROGRESS NOTES
Hospital Medicine Daily Progress Note    Date of Service  4/5/2023    Chief Complaint  Blair Bah is a 78 y.o. male admitted 4/4/2023 with weakness    Hospital Course  79 yo manw with COPD, pulmonary fibrosis, chronically on 2L O2, DM, BPH, prostate cancer on hospice care who was recently hospitalized and treated for PNA and discharged back to hospice. He returns with increasing weakness. CXR showed worsening interstitial infiltrates and small bilateral effusions    Interval Problem Update  He feels like his breathing is already doing better, nearly at his baseline.  He says when he was discharged it took him several days to get his antibiotic prescription.  PT OT to evaluate him today  Complains of urinary frequency, does not have hesitancy or dysuria.  We will check a UA to further evaluate.    I have discussed this patient's plan of care and discharge plan at IDT rounds today with Case Management, Nursing, Nursing leadership, and other members of the IDT team.    Consultants/Specialty  none    Code Status  DNAR/DNI    Disposition  Patient is not medically cleared for discharge.   Anticipate discharge to to home with organized home healthcare and close outpatient follow-up.  I have placed the appropriate orders for post-discharge needs.    Review of Systems  Review of Systems   Constitutional:  Positive for malaise/fatigue.   Respiratory:  Positive for cough and shortness of breath.    Cardiovascular:  Negative for chest pain.   Gastrointestinal:  Negative for abdominal pain.   Genitourinary:  Positive for frequency. Negative for dysuria and urgency.   Neurological:  Positive for weakness. Negative for headaches.      Physical Exam  Temp:  [36.4 °C (97.6 °F)-36.9 °C (98.5 °F)] 36.4 °C (97.6 °F)  Pulse:  [53-97] 90  Resp:  [15-25] 15  BP: (107-131)/(63-76) 108/63  SpO2:  [97 %-100 %] 97 %    Physical Exam  Vitals and nursing note reviewed.   HENT:      Head: Normocephalic.      Mouth/Throat:      Mouth:  Mucous membranes are moist.   Eyes:      General:         Right eye: No discharge.         Left eye: No discharge.   Cardiovascular:      Rate and Rhythm: Normal rate and regular rhythm.      Heart sounds: Murmur heard.   Pulmonary:      Breath sounds: Rales present. No wheezing.      Comments: Mild tachypnea  Abdominal:      Palpations: Abdomen is soft.      Tenderness: There is no abdominal tenderness.   Musculoskeletal:         General: No swelling.      Cervical back: Neck supple.      Comments: Diffuse muscle atrophy   Skin:     General: Skin is warm and dry.   Neurological:      Mental Status: He is alert and oriented to person, place, and time.       Fluids    Intake/Output Summary (Last 24 hours) at 4/5/2023 1609  Last data filed at 4/4/2023 2002  Gross per 24 hour   Intake 100 ml   Output --   Net 100 ml       Laboratory  Recent Labs     04/04/23  1724 04/05/23 0222   WBC 5.7 5.7   RBC 4.27* 4.30*   HEMOGLOBIN 11.6* 11.7*   HEMATOCRIT 35.5* 35.8*   MCV 83.1 83.3   MCH 27.2 27.2   MCHC 32.7* 32.7*   RDW 42.7 42.6   PLATELETCT 347 328   MPV 9.5 9.5     Recent Labs     04/04/23  1724 04/05/23 0222   SODIUM 135 135   POTASSIUM 3.9 3.8   CHLORIDE 96 97   CO2 29 26   GLUCOSE 116* 108*   BUN 11 12   CREATININE 0.63 0.42*   CALCIUM 8.8 8.5                   Imaging  DX-CHEST-PORTABLE (1 VIEW)   Final Result      1.  Worsening interstitial infiltrates and/or edema.   2.  Possible small bilateral pleural effusions.           Assessment/Plan  * Weakness generalized- (present on admission)  Assessment & Plan  Likely deconditioning  PT OT consulted    Chronic hypoxemic respiratory failure (HCC)  Assessment & Plan  His baseline of 2 L    Idiopathic pulmonary fibrosis (HCC)- (present on admission)  Assessment & Plan  [facility]  [facility]    Patient on full hospice care- (present on admission)  Assessment & Plan  Case management to follow-up to see if his hospice  was revoked      Community acquired pneumonia  Assessment & Plan  Recently admitted for this 3/28 - 3/31, treated with ceftriaxone/azithromycin switched to Augmentin on discharge  Redness of breath and cough improving  He will continue his course of Augmentin to completion    Type 2 diabetes mellitus with hyperglycemia, with long-term current use of insulin (HCC)- (present on admission)  Assessment & Plan  Glucose is low range  Continue ISS  Long-acting for now    Prostate cancer (HCC)- (present on admission)  Assessment & Plan  Case management to follow-up to see if his hospice was revoked    BPH (benign prostatic hyperplasia)- (present on admission)  Assessment & Plan  Continue Flomax  UA         VTE prophylaxis: SCDs/TEDs    I have performed a physical exam and reviewed and updated ROS and Plan today (4/5/2023). In review of yesterday's note (4/4/2023), there are no changes except as documented above.

## 2023-04-05 NOTE — FACE TO FACE
Face to Face Supporting Documentation - Home Health    The encounter with this patient was in whole or in part the primary reason for home health admission.    Date of encounter:   Patient:                    MRN:                       YOB: 2021  Blair Bah  [id number]  1945     Home health to see patient for:  Skilled Nursing care for assessment, interventions & education, Physical Therapy evaluation and treatment, and Occupational therapy evaluation and treatment    Skilled need for:  Comment: pneumonia, COPD, pulmonary fibrosis    Skilled nursing interventions to include:  Comment: Monitor vital signs, PT OT    Homebound status evidenced by:  Needs the assistance of another person in order to leave the home. Leaving home requires a considerable and taxing effort. There is a normal inability to leave the home.    Community Physician to provide follow up care: Morenita Min M.D.     Optional Interventions? No      I certify the face to face encounter for this home health care referral meets the CMS requirements and the encounter/clinical assessment with the patient was, in whole, or in part, for the medical condition(s) listed above, which is the primary reason for home health care. Based on my clinical findings: the service(s) are medically necessary, support the need for home health care, and the homebound criteria are met.  I certify that this patient has had a face to face encounter by myself.  Nilson Delgado M.D. - NPI: [id number]

## 2023-04-05 NOTE — THERAPY
"Occupational Therapy   Initial Evaluation     Patient Name: Blair Bah  Age:  78 y.o., Sex:  male  Medical Record #: [id number]  Today's Date: 4/5/2023     Precautions  Precautions: Fall Risk    Assessment  Patient is 78 y.o. male who was recently admitted to the hospital and treated for community-acquired pneumonia. He was discharged home 3/31. He returned on 4/4 with continued weakness, worsening deconditioning, and chronic hypoxic respiratory failure. Pt with a h/o COPD, type 2 diabetes, BPH, and untreated prostate cancer for which he is on hospice care. During OT eval, pt limited by generalized weakness, poor activity tolerance and impaired balance impacting ADLs and ADL transfers. The pt wants to return home with caregiver and hospice support. He would benefit from ongoing OT services while he remain in the hospital.     Plan    Occupational Therapy Initial Treatment Plan   Treatment Interventions: Self Care / Activities of Daily Living, Adaptive Equipment, Therapeutic Exercises, Therapeutic Activity  Treatment Frequency: 3 Times per Week  Duration: Until Therapy Goals Met    DC Equipment Recommendations: None  Discharge Recommendations: Recommend home health for continued occupational therapy services if patient is eligible. This is not a barrier to discharge.     Subjective    \"I had therapy come to my home before, when I was leaning to the right. It really helped.\"     Objective       04/05/23 1121   Prior Living Situation   Prior Services Intermittent Physical Support for ADL Per Service   Housing / Facility 2 Story Apartment / Condo   Steps Into Home 0   Steps In Home 0   Elevator Yes   Bathroom Set up Walk In Shower;Grab Bars;Shower Chair   Equipment Owned 4-Wheel Walker;Tub / Shower Seat;Grab Bar(s) In Tub / Shower;Grab Bar(s) By Toilet;Hand Held Shower;Oxygen  (on 3L O2 at baseline)   Lives with - Patient's Self Care Capacity Alone and Able to Care For Self   Comments Pt lives alone but has a " friend/caregiver Lisa who visits several times per day to assist. Pt also has hospice services and receives support from them weekly   Prior Level of ADL Function   Self Feeding Independent   Grooming / Hygiene Independent   Bathing Independent   Dressing Independent   Toileting Independent   Comments above PLOF reflects pt's status before recent pneumonia. Since recent discharge from the hospital with pneumonia   Prior Level of IADL Function   Medication Management Independent   Laundry Independent   Kitchen Mobility Independent   Prior Level Of Mobility Independent With Device in Home  (uses 4WW)   Comments above PLOF reflects pt's function before developing pneumonia   Precautions   Precautions Fall Risk   Vitals   O2 Delivery Device Nasal Cannula   Pain 0 - 10 Group   Therapist Pain Assessment During Activity;Nurse Notified;0   Cognition    Cognition / Consciousness WDL   Level of Consciousness Alert   Active ROM Upper Body   Active ROM Upper Body  WDL   Strength Upper Body   Upper Body Strength  X   Gross Strength Generalized Weakness, Equal Bilaterally.    Upper Body Muscle Tone   Upper Body Muscle Tone  WDL   Coordination Upper Body   Coordination WDL   Balance Assessment   Sitting Balance (Static) Fair   Sitting Balance (Dynamic) Fair   Standing Balance (Static) Fair -   Standing Balance (Dynamic) Fair -   Weight Shift Sitting Fair   Weight Shift Standing Fair   Comments standing with 4WW   Bed Mobility    Supine to Sit Minimal Assist   Sit to Supine Supervised   Scooting Supervised   Rolling Supervised   ADL Assessment   Grooming Supervision;Seated  (unable to stand at sink due to shortness of breath, needing to sit down after walking to/from bathroom)   Lower Body Dressing Maximal Assist  (shoes maxA to don, supervision to doff)   Toileting   (denies need for BM but did demo transfer, condom cath in place)   6 Clicks Daily Activity Score 18   Functional Mobility   Sit to Stand Contact Guard Assist    Toilet Transfers Minimal Assist  (due to low toilet, used grab bar)   Mobility walked to/from bathroom to demo toilet transfer   Patient / Family Goals   Patient / Family Goal #1 to go home   Short Term Goals   Short Term Goal # 1 Pt will complete toilet transfer with supervision and use of grab bar   Short Term Goal # 2 Pt will complete toileting with supervision, including clothing management and hygiene   Short Term Goal # 3 Pt will groom seated at sink with supervision   Education Group   Education Provided Role of Occupational Therapist   Role of Occupational Therapist Patient Response Patient;Acceptance;Explanation;Reinforcement Needed   Occupational Therapy Initial Treatment Plan    Treatment Interventions Self Care / Activities of Daily Living;Adaptive Equipment;Therapeutic Exercises;Therapeutic Activity   Treatment Frequency 3 Times per Week   Duration Until Therapy Goals Met   Problem List   Problem List Decreased Active Daily Living Skills;Decreased Homemaking Skills;Decreased Upper Extremity Strength Right;Decreased Upper Extremity Strength Left;Decreased Functional Mobility;Decreased Activity Tolerance;Impaired Postural Control / Balance

## 2023-04-05 NOTE — ED NOTES
Report from Mary Kay MARTINEZ. Care assumed of patient. Rounded on pt. Pt resting comfortably in bed at this time. On vitals monitor. Respirations equal and unlabored. Vitals signs stable. No acute distress at this time. Call light within reach.

## 2023-04-05 NOTE — DIETARY
"Nutrition services: Day 1 of admit.  Blair Bah is a 78 y.o. male with admitting DX of weakness generalized.   Consult received for unsure wt loss (MST 2).     Assessment:  Height: 180.3 cm (5' 11\")  Weight: 79.4 kg (175 lb 0.7 oz)  Body mass index is 24.41 kg/m²., BMI classification: Normal  Diet/Intake: CHO consistent.     Evaluation:   RD visited pt at bedside. Pt reports having a bad appetite. Pt described intake as feeling hungry, making a good meal, taking one bite and not wanting to eat anymore. Pt states \"food doesn't taste like it looks\". Pt reports poor po for ~1 year now.   During this time pt has been drinking Boost, pt would like to continue regimen in hospital.   Pt reports UBW of 175 lbs. Per chart review this is the patients current wt. RD asked pt to clarify if this has been his wt prior to his appetite loss 1 year ago, pt stated yes. No wt loss noted.   Per NFPE pt with severe muscle and fat loss in the temporal, clavicle, acromion, interosseous and orbital region.   Labs: Glu 108.   Meds: Lantus, SSI, zofran, bowel regimen.   +BM 4/3    Malnutrition Risk: Per ASPEN guidelines, pt meets criteria for severe malnutrition in the context of chronic illness related to inadequate nutrient intake as evidence by Pt meeting <75% of nutrient needs for >1 month and severe muscle and fat loss in multiple regions.       Recommendations/Plan:  Boost Glucose Control TID.    Encourage intake of >50%.  Document intake of all PO as % taken in ADL's to provide interdisciplinary communication across all shifts.   Monitor weight.  Nutrition rep will continue to see patient for ongoing meal and snack preferences.       RD following.   "

## 2023-04-05 NOTE — ED NOTES
Pt transferred out of ED at this time with Transport team. Pt is A&Ox4, with stable vital signs and no apparent distress upon transfer 2L O2 with adequate O2 volume in bed tank. All paperwork and personal belongings sent with pt.

## 2023-04-06 ENCOUNTER — PATIENT OUTREACH (OUTPATIENT)
Dept: SCHEDULING | Facility: IMAGING CENTER | Age: 78
End: 2023-04-06
Payer: COMMERCIAL

## 2023-04-06 VITALS
TEMPERATURE: 97.4 F | DIASTOLIC BLOOD PRESSURE: 65 MMHG | WEIGHT: 175.04 LBS | OXYGEN SATURATION: 97 % | RESPIRATION RATE: 16 BRPM | HEIGHT: 71 IN | HEART RATE: 69 BPM | BODY MASS INDEX: 24.51 KG/M2 | SYSTOLIC BLOOD PRESSURE: 103 MMHG

## 2023-04-06 LAB
GLUCOSE BLD STRIP.AUTO-MCNC: 138 MG/DL (ref 65–99)
GLUCOSE BLD STRIP.AUTO-MCNC: 95 MG/DL (ref 65–99)

## 2023-04-06 PROCEDURE — 99239 HOSP IP/OBS DSCHRG MGMT >30: CPT | Performed by: INTERNAL MEDICINE

## 2023-04-06 PROCEDURE — 700102 HCHG RX REV CODE 250 W/ 637 OVERRIDE(OP): Performed by: STUDENT IN AN ORGANIZED HEALTH CARE EDUCATION/TRAINING PROGRAM

## 2023-04-06 PROCEDURE — 82962 GLUCOSE BLOOD TEST: CPT

## 2023-04-06 PROCEDURE — 94760 N-INVAS EAR/PLS OXIMETRY 1: CPT

## 2023-04-06 PROCEDURE — A9270 NON-COVERED ITEM OR SERVICE: HCPCS | Performed by: STUDENT IN AN ORGANIZED HEALTH CARE EDUCATION/TRAINING PROGRAM

## 2023-04-06 RX ADMIN — SENNOSIDES AND DOCUSATE SODIUM 2 TABLET: 50; 8.6 TABLET ORAL at 05:43

## 2023-04-06 RX ADMIN — TAMSULOSIN HYDROCHLORIDE 0.4 MG: 0.4 CAPSULE ORAL at 05:43

## 2023-04-06 NOTE — PROGRESS NOTES
Pt alert/oriented x4, denies pain. Refused insulin, blood sugar=152. Pt in bed watching television, needs met. Call light within reach, personal belongings available, bed in lowest position, treaded socks on, and bed alarm on. Hourly rounding in place.

## 2023-04-06 NOTE — DISCHARGE PLANNING
Received Choice form at 2390  Agency/Facility Name: [facility] ([facility]  Referral sent per Choice form @ 3499

## 2023-04-06 NOTE — CARE PLAN
The patient is Stable - Low risk of patient condition declining or worsening    Shift Goals  Clinical Goals: maintain blood sugar levels  Patient Goals: d/c home  Family Goals: DAISHA    Progress made toward(s) clinical / shift goals:  Patient progressing towards goals. Call light within reach. Hourly rounding.     Patient is not progressing towards the following goals:      Problem: Knowledge Deficit - Standard  Goal: Patient and family/care givers will demonstrate understanding of plan of care, disease process/condition, diagnostic tests and medications  Outcome: Progressing     Problem: Fall Risk  Goal: Patient will remain free from falls  Outcome: Progressing

## 2023-04-06 NOTE — DISCHARGE SUMMARY
Discharge Summary    CHIEF COMPLAINT ON ADMISSION  Chief Complaint   Patient presents with    Weakness     Pt comes in for increasing weakness since Saturday. Pt was discharged on saturday after being treated for pneumonia        Reason for Admission  EMS     Admission Date  4/4/2023    CODE STATUS  DNAR/DNI    HPI & HOSPITAL COURSE  77 yo man with COPD, pulmonary fibrosis, chronically on 2L O2, DM, BPH, prostate cancer on hospice care who was recently hospitalized and treated for PNA and discharged back to hospice.  He was unable to fill his antibiotic for a few days and had worsening shortness of breath and weakness. CXR showed worsening interstitial infiltrates and small bilateral effusions.  Was restarted on antibiotic and he quickly improved.  His shortness of breath returned to baseline.  He was stable on his 2 L of oxygen.  PT OT evaluated him and recommended home with home health.  Discussed with the patient going home with home health, but he preferred to go back on hospice.  Case management [facility]    Therefore, he is discharged in good and stable condition to hospice.    The patient met 2-midnight criteria for an inpatient stay at the time of discharge.    Discharge Date  4/6/23    FOLLOW UP ITEMS POST DISCHARGE  [facility]osp[facility] care  [facility]    DISCHARGE DIAGNOSES  Principal Problem:    Weakness generalized POA: Yes  Active Problems:    BPH (benign prostatic hyperplasia) POA: Yes    Prostate cancer (HCC) POA: Yes    Type 2 diabetes mellitus with hyperglycemia, with long-term current use of insulin (HCC) POA: Yes    Community acquired pneumonia POA: Unknown    Patient on full hospice care POA: Yes    Idiopathic pulmonary fibrosis (HCC) POA: Yes    Chronic hypoxemic respiratory failure (HCC) POA: Unknown  Resolved Problems:    * No resolved hospital problems. *      FOLLOW UP  Morenita Min M.D.  [address]  [id number] [phone]    Call  Please call the [facility] to schedule an appointment with a primary care provider for a hospital follow up. Thank you.      MEDICATIONS ON DISCHARGE     Medication List        CONTINUE taking these medications        Instructions   albuterol 2.5mg/0.5ml Nebu  Commonly known as: PROVENTIL   Take 2.5 mg by nebulization every 6 hours as needed for Shortness of Breath.  Dose: 2.5 mg     docusate calcium 240 MG Caps  Commonly known as: SURFAK   Take 240 mg by mouth 1 time a day as needed for Constipation.  Dose: 240 mg     insulin glargine 100 UNIT/ML Soln  Commonly known as: Lantus   Inject 20 Units under the skin every evening.  Dose: 20 Units     metFORMIN 500 MG Tabs  Commonly known as: GLUCOPHAGE   Take 1,000 mg by mouth 2 times a day with meals.  Dose: 1,000 mg     tamsulosin 0.4 MG capsule  Commonly known as: FLOMAX   Take 0.4 mg by mouth every day.  Dose: 0.4 mg            STOP taking these medications      amoxicillin-clavulanate 875-125 MG Tabs  Commonly known as: AUGMENTIN              Allergies  No Known Allergies    DIET  Orders Placed This Encounter   Procedures    Diet Order Diet: Consistent CHO (Diabetic)     Standing Status:   Standing     Number of Occurrences:   1     Order Specific Question:   Diet:     Answer:   Consistent CHO (Diabetic) [4]       ACTIVITY  As tolerated.      CONSULTATIONS  none    PROCEDURES  DX-CHEST-PORTABLE (1 VIEW)   Final Result      1.  Worsening interstitial infiltrates and/or edema.   2.  Possible small bilateral pleural effusions.            LABORATORY  Lab Results   Component Value Date    SODIUM 135 04/05/2023    POTASSIUM 3.8 04/05/2023    CHLORIDE 97 04/05/2023    CO2 26 04/05/2023    GLUCOSE 108 (H) 04/05/2023    BUN 12 04/05/2023    CREATININE 0.42 (L) 04/05/2023        Lab Results   Component Value Date    WBC 5.7 04/05/2023    HEMOGLOBIN 11.7 (L) 04/05/2023    HEMATOCRIT 35.8 (L) 04/05/2023    PLATELETCT 328 04/05/2023         Total time of the discharge process is 32 minutes.

## 2023-04-06 NOTE — PROGRESS NOTES
Patient is ready for discharge. Waiting for ride. Alert and oriented x4. VSS. On 2L of O2 baseline. Up with assist x1 with FWW. Steady on feet. Continent of urine and bowel. Last BM 4/6. Skin is intact. No complaints of pain.

## 2023-04-06 NOTE — RESPIRATORY CARE
"COPD EDUCATION by COPD CLINICAL EDUCATOR  4/6/2023 at 12:24 PM by Erum Solomon, RRT     Patient interviewed by COPD education team. Patient refused COPD program at this time. An Action Plan was completed in the EMR to reflect current Respiratory Medication use.                    COPD Assessment  COPD Clinical Specialists ONLY  COPD Education Initiated: Yes--Short Intervention (declined says he has ILD and Cancer is on Hospice and Home Health has nebulizer and Home Oxygen)  DME Company: [facility] [employer]  DME Equipment Type: 2 lpm  Pulmonologist Name: Dr Debbie Nath [facility]  Pulmonary Rehab: No  Smoking Cessation: No (quit)  Hospice: Yes (On serice with [facility] and plan to return)  Home Health Care: Yes (Face to Face and choice pending)  Geriatric Specialty Group: N/A  Is this a COPD exacerbation patient?: No  (OP) Pulmonary Function Testing: Yes (PFT in 2019 no data)    PFT Results  No results found for: PFT    Meds to Beds  Would the patient like to opt in for Bedside Medication Delivery at Discharge?: Yes, interested     MY COPD ACTION PLAN     It is recommended that patients and physicians /healthcare providers complete this action plan together. This plan should be discussed at each physician visit and updated as needed.    The green, yellow and red zones show groups of symptoms of COPD. This list of symptoms is not comprehensive, and you may experience other symptoms. In the \"Actions\" column, your healthcare provider has recommended actions for you to take based on your symptoms.    Patient Name: Blair Bah   YOB: 1945   Last Updated on: 3/29/2023  4:52 PM   Green Zone:  I am doing well today Actions     Usual activitiy and exercise level   Take daily medications     Usual amounts of cough and phlegm/mucus   Use oxygen as prescribed     Sleep well at night   Continue regular exercise/diet plan     Appetite is good   At all times avoid cigarette smoke, inhaled irritants " "    Daily Medications (these medications are taken every day):                Yellow Zone:  I am having a bad day or a COPD flare Actions     More breathless than usual   Continue daily medications     I have less energy for my daily activities   Use quick relief inhaler as ordered     Increased or thicker phlegm/mucus   Use oxygen as prescribed     Using quick relief inhaler/nebulizer more often   Get plenty of rest     Swelling of ankles more than usual   Use pursed lip breathing     More coughing than usual   At all times avoid cigarette smoke, inhaled irritants     I feel like I have a \"chest cold\"     Poor sleep and my symptoms woke me up     My appetite is not good     My medicine is not helping      Call provider immediately if symptoms don’t improve     Continue daily medications, add rescue medications:   Albuterol/Ipratropium (Combivent, Duoneb) 3mL via nebulizer Every 6 hours PRN       Medications to be used during a flare up, (as Discussed with Provider):           Additional Information:  Rinse nebulizer after use    Red Zone:  I need urgent medical care Actions     Severe shortness of breath even at rest   Call 911 or seek medical care immediately     Not able to do any activity because of breathing      Fever or shaking chills      Feeling confused or very drowsy       Chest pains      Coughing up blood                  "

## 2023-04-07 NOTE — DISCHARGE PLANNING
Case Management Discharge Planning    Admission Date: 4/4/2023  GMLOS: 2.5  ALOS: 2    6-Clicks ADL Score: 18  6-Clicks Mobility Score: 17  PT and/or OT Eval ordered: NA  Post-acute Referrals Ordered: Yes  Post-acute Choice Obtained: Yes  Has referral(s) been sent to post-acute provider:  Yes      Anticipated Discharge Dispo: Discharge Disposition: D/T to hospice home (50)    DME Needed: No    Action(s) Taken: Updated Provider/Nurse on Discharge Plan, Patient Conference, Choice obtained, and Referral(s) sent    Escalations Completed: None    Medically Clear: Yes    Next Steps: CM updated by MD that pt is medically cleared to DC home with caregiver and hospice. Pt prior on hospice with [facility]. Cm called [facility] hospice and confirmed that pt is on service with them. Confirmed. CM met with pt at the bedside to follow up with DCPlan for home with hospice. Pt provided with Freedom of Choice and form sent to [facility]. Pt states he has all necessary DME at home provided by hospice. Pt has a ride that will transport pt home. RN updated. No additional needs identified. If needs arise please let CM know.     Barriers to Discharge: None    Is the patient up for discharge tomorrow: No - DC home today.

## 2023-04-07 NOTE — DOCUMENTATION QUERY
[facility]                                                                       Query Response Note      PATIENT:               RENETTA CHAND  ACCT #:                  [id number]  MRN:                     [id number]  :                      1945  ADMIT DATE:       2023 4:50 PM  DISCH DATE:        2023 4:31 PM  RESPONDING  PROVIDER #:        [id number]           QUERY TEXT:    Per RD evaluation on , patient meets [place] criteria for severe malnutrition in chronic illness AEB meeting <75% of nutrient needs for >1 month and severe muscle and fat loss  in multiple regions.    Based upon your judgment and the above clinical indicators, please select the most appropriate diagnosis for the findings.      The patient's clinical indicators include:  77 yo M admitted with generalized weakness    Clinical Indicators: American Society for Parenteral and Enteral Nutrition (ASPEN) criteria (presence of at least two)    Per dietary consult dated 23:     -  meeting <75% of nutrient needs for >1 month  -  severe muscle and fat loss  in temporal, clavicle, acromion, interosseous and orbital region.  - ***     Additional documentation per dietary consult:    - BMI 24.41 kg/m²., BMI classification: Normal    Risk Factors: Reported loss of appetite, Type 2 DM, Advanced age, COPD    Treatment: RD consult; document oral intake; monitor wt.; nutrition rep, Boost glucose control TID      Contact me with any questions.    Thank you for your time and attention,  Jess Baker RN, [email or web address]  Connect via email, Voalte or messenger.  Options provided:   -- Severe malnutrition   -- Moderate malnutrition   -- Mild malnutrition   -- Insignificant finding/no effect on patient stay and treatment   -- Other explanation, (please specify other explanation)   -- Unable to determine      Query created by: Jess Baker on 2023 9:44  AM    RESPONSE TEXT:    Severe malnutrition          Electronically signed by:  JOHNNA LEÓN MD 4/7/2023 4:34 PM
